# Patient Record
Sex: FEMALE | Race: WHITE | Employment: FULL TIME | URBAN - METROPOLITAN AREA
[De-identification: names, ages, dates, MRNs, and addresses within clinical notes are randomized per-mention and may not be internally consistent; named-entity substitution may affect disease eponyms.]

---

## 2019-09-10 LAB
ABO + RH BLD: NORMAL
ABO + RH BLD: NORMAL
BLD GP AB SCN SERPL QL: NORMAL
C TRACH DNA SPEC QL PROBE+SIG AMP: NORMAL
HBV SURFACE AG SERPL QL IA: NORMAL
HEMOGLOBIN: 13.1 G/DL (ref 11.7–15.7)
HIV 1+2 AB+HIV1 P24 AG SERPL QL IA: NORMAL
N GONORRHOEA DNA SPEC QL PROBE+SIG AMP: NORMAL
PLATELET # BLD AUTO: 355 10^9/L
RUBELLA ABY IGG: NORMAL
RUBELLA ANTIBODY IGG QUANTITATIVE: 3.24 IU/ML
TREPONEMA ANTIBODIES: NORMAL

## 2019-11-07 ENCOUNTER — MEDICAL CORRESPONDENCE (OUTPATIENT)
Dept: HEALTH INFORMATION MANAGEMENT | Facility: CLINIC | Age: 39
End: 2019-11-07

## 2019-11-07 ENCOUNTER — TRANSFERRED RECORDS (OUTPATIENT)
Dept: HEALTH INFORMATION MANAGEMENT | Facility: CLINIC | Age: 39
End: 2019-11-07

## 2019-12-12 ENCOUNTER — PRE VISIT (OUTPATIENT)
Dept: MATERNAL FETAL MEDICINE | Facility: CLINIC | Age: 39
End: 2019-12-12

## 2019-12-16 ENCOUNTER — OFFICE VISIT (OUTPATIENT)
Dept: MATERNAL FETAL MEDICINE | Facility: CLINIC | Age: 39
End: 2019-12-16
Attending: OBSTETRICS & GYNECOLOGY
Payer: COMMERCIAL

## 2019-12-16 ENCOUNTER — HOSPITAL ENCOUNTER (OUTPATIENT)
Dept: ULTRASOUND IMAGING | Facility: CLINIC | Age: 39
Discharge: HOME | End: 2019-12-16
Attending: OBSTETRICS & GYNECOLOGY | Admitting: OBSTETRICS & GYNECOLOGY
Payer: COMMERCIAL

## 2019-12-16 DIAGNOSIS — O09.522 MULTIGRAVIDA OF ADVANCED MATERNAL AGE IN SECOND TRIMESTER: Primary | ICD-10-CM

## 2019-12-16 DIAGNOSIS — O26.879 SHORT CERVIX AFFECTING PREGNANCY: ICD-10-CM

## 2019-12-16 DIAGNOSIS — O26.90 PREGNANCY RELATED CONDITION, ANTEPARTUM: ICD-10-CM

## 2019-12-16 DIAGNOSIS — O35.BXX0 VENTRICULAR SEPTAL DEFECT (VSD) OF FETUS IN SINGLETON PREGNANCY, ANTEPARTUM: ICD-10-CM

## 2019-12-16 DIAGNOSIS — O26.90 PREGNANCY RELATED CONDITION, ANTEPARTUM: Primary | ICD-10-CM

## 2019-12-16 PROCEDURE — 76811 OB US DETAILED SNGL FETUS: CPT

## 2019-12-16 PROCEDURE — 76817 TRANSVAGINAL US OBSTETRIC: CPT | Performed by: OBSTETRICS & GYNECOLOGY

## 2019-12-16 PROCEDURE — 96040 ZZH GENETIC COUNSELING, EACH 30 MINUTES: CPT | Mod: ZF | Performed by: GENETIC COUNSELOR, MS

## 2019-12-16 NOTE — PROGRESS NOTES
"Please see \"Imaging\" tab under \"Chart Review\" for details of today's US at the [facility].    Camron Henry MD  Maternal-Fetal Medicine      "

## 2019-12-16 NOTE — PROGRESS NOTES
[facility]  Genetic Counseling Consult    Patient:  Jessie Duarte YOB: 1980   Date of Service:  19      Jessie Duarte was seen at the [facility] for genetic consultation as part of her appointment for comprehensive ultrasound.  The indication for genetic counseling is advanced maternal age and positive NIPT for XXY. The patient was unaccompanied to today's visit.        Impression/Plan:   1. Jessie had a cell-free fetal DNA test earlier in pregnancy, which indicated an increased risk of XXY. The patient declines genetic amniocentesis today.    2. Jessie had a comprehensive (level II) ultrasound today.  Please see the ultrasound report for further details.    Pregnancy History:   /Parity:    Age at Delivery: 39 year old  CRESENCIO: 2020, by Last Menstrual Period  Gestational Age: 22w0d    No significant complications or exposures were reported in the current pregnancy.    Jessie perry pregnancy history is significant for one term  of a healthy girl with a different partner.    Medical History:   Jessie perry reported medical history is not expected to impact pregnancy management or risks to fetal development.       Family History:   A three-generation pedigree was obtained, and is scanned under the  Media  tab.   The following significant findings were reported by Jessie:    Jessie's partner, Dickson is 29 and healthy.   It was reported that Jessie's maternal half sister and half brother were identified to have Autism. They were reported to have no other health concerns and look similar to other family members. Autism is a spectrum of developmental disorders characterized by impaired social interaction, problems with verbal and nonverbal communication, and unusual, repetitive, or severely limited activities and interests.  Autism is a heterogeneous disorder, including genetic and non-genetic causes. In a  small percentage of individuals with ASD, it is a feature of a certain genetic condition such as Down syndrome, fragile X syndrome, or Rett syndrome. However, in most isolated cases the cause may be multifactorial, meaning a combination of genetic and environmental factors. Jessie is unsure if they have had a workup to determine if there may be an underlying etiology for the Autism in these relatives.  Given that these are third degree relatives to the pregnancy, the chance may be increased.    Otherwise, the reported family history is negative for multiple miscarriages, stillbirths, birth defects, intellectual disability, known genetic conditions, and consanguinity.       Carrier Screening:   The patient reports that she and the father of the pregnancy have  ancestry:     Cystic fibrosis is an autosomal recessive genetic condition that occurs with increased frequency in individuals of  ancestry and carrier screening for this condition is available.  In addition,  screening in the [facility] includes cystic fibrosis.    The patient reports that she is of  ancestry:     The hemoglobinopathies are a group of genetic blood diseases that occur with increased frequency in individuals of  ancestry and carrier screening for these conditions is available.  Carrier screening for the hemoglobinopathies includes a CBC with red blood cell indices, a ferritin level, and a quantitative hemoglobin electrophoresis or HPLC.  In addition,  screening in the [facility] includes many of the hemoglobinopathies.      Expanded carrier screening for mutations in a large panel of genes associated with autosomal recessive conditions including cystic fibrosis, spinal muscular atrophy, and others, is now available.      The patient has declined the carrier screening options reviewed today.       Risk Assessment for Chromosome Conditions:   We explained that the risk for fetal chromosome  "abnormalities increases with maternal age. We discussed specific features of common chromosome abnormalities, including Down syndrome, trisomy 13, trisomy 18, and sex chromosome trisomies.      - At age 39 at midtrimester, the risk to have a baby with Down syndrome is 1 in 129.     - At age 39 at midtrimester, the risk to have a baby with any chromosome abnormality is 1 in 65.       Jessie had maternal serum screening earlier in pregnancy.    Non-invasive Prenatal Testing (NIPT)    Maternal plasma cell-free DNA testing    Screens for fetal trisomy 21, trisomy 13, trisomy 18, and sex chromosome aneuploidy    First trimester ultrasound with nuchal translucency was reportedly within normal limits.    Jessie had a Innatal test earlier in pregnancy; we reviewed the result today, which is positive for an increased risk for XXY. No aneuploidy was detected for chromosome 13, chromosome 18 and chromosome 21.    There is currently not a lot of published data regarding the exact sensitivity and specificity of NIPT analysis with respect to screening for XXY. The performing laboratory estimated that the positive predictive value (PPV) for this result (ie. the chance that this is a \"true positive\") is about 32%.    We discussed that 47, XXY is caused by an extra sex chromosome. The chromosomal change occurs as a random event and is not inherited. Some males with 47, XXY will have a mosaic type in which some cells have 46,XY and others 47,XXY.      Possible explanations for a positive NIPT result include an affected fetus with 47,XXY or mosaic cell line, a false positive result, and confined placental mosaicism. Jessie was offered an amniocentesis to obtain a definitive diagnosis and this was declined. We also discussed that if she did not want to pursue amniocentesis, chromosome analysis could be performed on the baby's blood after delivery. This can be coordinated by the pediatrician or pediatric genetics.    We briefly discussed " the common features of 47,XXY. The specific features can be quite variable from one affected male to another, making it difficult to predict exactly what symptoms an individual may have. Additionally, it is thought that many individuals with 47,XXY are not diagnosed due to the subtlety of symptoms.     The most consistent feature of this condition is decreased testosterone. Boys with decreased testosterone can show some differences in physical development during adolescence/adulthood, including less facial/body hair, some breast development, and less muscle mass.  We reviewed that supplemental testosterone is often used to aid in the development of male secondary sexual characteristics.     Many men with 47,XXY experience infertility. Although most males with 47, XXY are infertile, many produce sperm and may be able to conceive with assisted reproduction.    We talked about that at birth, babies with 47,XXY are not generally expected to look different than other boys.  Occasionally, babies with 47,XXY have undescended testes, hypospadias, or a smaller than average penis. Later in development physical features like being taller or having flat feet may be noticed.     A portion of boys with 47,XXY have some delays in development. They can also have mild Autism, speech delay, learning difficulties including ADHD, and depression/anxiety. We reviewed that these are also common among individuals with typical sex chromosomes.     Individuals with 47, XXY are at an increased chance of developing diabetes, metabolic syndrome, osteoporosis, cardiovascular diseases, autoimmmune disorders, and certain mental health problems. Regular screening for these health problems is suggested. Jessie was encouraged to share all of this information with their pediatrician.     Jessie was provided with written information regarding 47,XXY as well as my contact information if any questions or concerns arise. I encouraged her to reach out if  there is any additional information or resources she may find helpful.        Testing Options:   We discussed the following options:   Genetic Amniocentesis    Invasive procedure typically performed in the second trimester by which amniotic fluid is obtained for the purpose of chromosome analysis and/or other prenatal genetic analysis    Diagnostic results; >99% sensitivity for fetal chromosome abnormalities    AFAFP measurement tests for open neural tube defects    We reviewed the benefits and limitations of this testing.  Screening tests provide a risk assessment specific to the pregnancy for certain fetal chromosome abnormalities, but cannot definitively diagnose or exclude a fetal chromosome abnormality.  Follow-up genetic counseling and consideration of diagnostic testing is recommended with any abnormal screening result.     Diagnostic tests carry inherent risks- including risk of miscarriage- that require careful consideration.  These tests can detect fetal chromosome abnormalities with greater than 99% certainty.  Results can be compromised by maternal cell contamination or mosaicism, and are limited by the resolution of cytogenetic G-banding technology.  There is no screening nor diagnostic test that can detect all forms of birth defects or mental disability.    It was a pleasure to be involved with Jessie perry care. Face-to-face time of the meeting was 35 minutes.    Monie Prather MS, [facility]  Maternal Fetal Medicine  [facility]  Ph: [phone]  [email or web address]

## 2019-12-19 ENCOUNTER — OFFICE VISIT (OUTPATIENT)
Dept: MATERNAL FETAL MEDICINE | Facility: CLINIC | Age: 39
End: 2019-12-19
Attending: OBSTETRICS & GYNECOLOGY
Payer: COMMERCIAL

## 2019-12-19 ENCOUNTER — HOSPITAL ENCOUNTER (OUTPATIENT)
Dept: ULTRASOUND IMAGING | Facility: CLINIC | Age: 39
Discharge: HOME | End: 2019-12-19
Attending: OBSTETRICS & GYNECOLOGY | Admitting: OBSTETRICS & GYNECOLOGY
Payer: COMMERCIAL

## 2019-12-19 DIAGNOSIS — O26.879 SHORT CERVIX AFFECTING PREGNANCY: ICD-10-CM

## 2019-12-19 DIAGNOSIS — O26.879 SHORT CERVIX AFFECTING PREGNANCY: Primary | ICD-10-CM

## 2019-12-19 PROCEDURE — 76817 TRANSVAGINAL US OBSTETRIC: CPT

## 2019-12-19 NOTE — PROGRESS NOTES
"Please see \"Imaging\" tab under \"Chart Review\" for details of today's US at the [facility].    Camron Henry MD  Maternal-Fetal Medicine      "

## 2019-12-24 ENCOUNTER — HOSPITAL ENCOUNTER (OUTPATIENT)
Dept: ULTRASOUND IMAGING | Facility: CLINIC | Age: 39
Discharge: HOME | End: 2019-12-24
Attending: OBSTETRICS & GYNECOLOGY | Admitting: OBSTETRICS & GYNECOLOGY
Payer: COMMERCIAL

## 2019-12-24 ENCOUNTER — OFFICE VISIT (OUTPATIENT)
Dept: MATERNAL FETAL MEDICINE | Facility: CLINIC | Age: 39
End: 2019-12-24
Attending: OBSTETRICS & GYNECOLOGY
Payer: COMMERCIAL

## 2019-12-24 DIAGNOSIS — O26.879 SHORT CERVIX AFFECTING PREGNANCY: ICD-10-CM

## 2019-12-24 DIAGNOSIS — O26.879 SHORT CERVIX AFFECTING PREGNANCY: Primary | ICD-10-CM

## 2019-12-24 PROCEDURE — 76817 TRANSVAGINAL US OBSTETRIC: CPT

## 2019-12-24 NOTE — PROGRESS NOTES
"Please see \"Imaging\" tab under \"Chart Review\" for details of today's US at the [facility].    Camron Henry MD  Maternal-Fetal Medicine      "

## 2020-01-24 ENCOUNTER — HOSPITAL ENCOUNTER (OUTPATIENT)
Dept: CARDIOLOGY | Facility: CLINIC | Age: 40
Discharge: HOME | End: 2020-01-24
Attending: OBSTETRICS & GYNECOLOGY | Admitting: OBSTETRICS & GYNECOLOGY
Payer: COMMERCIAL

## 2020-01-24 DIAGNOSIS — O35.BXX0 VENTRICULAR SEPTAL DEFECT (VSD) OF FETUS IN SINGLETON PREGNANCY, ANTEPARTUM: ICD-10-CM

## 2020-01-24 PROCEDURE — 76827 ECHO EXAM OF FETAL HEART: CPT

## 2020-01-24 NOTE — PROGRESS NOTES
[facility] Fetal Consult Note    Patient:  Jessie Rose MRN:  [id number]   YOB: 1980 Age:  39 year old   Date of Visit:  2020 PCP:  No Ref-Primary, Physician     Dear Dr. Henry:    I had the pleasure of seeing Jessie Rose at the [facility] on 2020 in fetal cardiology consultation. As you know, she is a 39 year old  at 27w4d who presented for fetal echocardiogram today because of suspected congenital heart disease (ventricular septal defect).     I performed and interpreted the fetal echocardiogram today, which demonstrated small mid-muscular ventricular septal defect. Normal fetal intracardiac connections. Normal right and left ventricular size and function.     With the help of diagrams, I reviewed the echo findings today with Jessie Rose. I discussed the fetal cardiac anatomy, function, physiology, and plans. I recommended delivery at [facility], with plans for  echocardiogram within the first week of life. I reviewed the importance of a post- transthoracic echocardiogram to confirm these findings and help direct management. She had appropriate questions which I addressed. I provided her with my direct contact information for additional questions after they left.    Plan:    1. No follow up Fetal Echo.   2. Transthoracic echocardiogram is recommended within the first week of life in a non-urgent fashion.     Thank you for allowing me to participate in Jessie's care. Please do not hesitate to contact me with questions or concerns.    This visit was separate from the performance and interpretation of the ultrasound. The majority of the time (>50%) was spent in counseling and coordination of care. I spent approximately 20 minutes in face-to-face time reviewing the above considerations.    Jose Houston M.D.  Pediatric Cardiology  [facility]  [phone], [facility] [address]  Fax [phone]

## 2020-03-13 ENCOUNTER — HOSPITAL ENCOUNTER (INPATIENT)
Facility: CLINIC | Age: 40
LOS: 2 days | Discharge: HOME | End: 2020-03-15
Attending: OBSTETRICS & GYNECOLOGY | Admitting: OBSTETRICS & GYNECOLOGY
Payer: COMMERCIAL

## 2020-03-13 DIAGNOSIS — O42.019 PRETERM PREMATURE RUPTURE OF MEMBRANES WITH ONSET OF LABOR WITHIN 24 HOURS OF RUPTURE: ICD-10-CM

## 2020-03-13 PROBLEM — O42.919 PRETERM PREMATURE RUPTURE OF MEMBRANES: Status: ACTIVE | Noted: 2020-03-13

## 2020-03-13 LAB
ABO + RH BLD: NORMAL
ABO + RH BLD: NORMAL
AMPHETAMINES UR QL SCN: NEGATIVE
CANNABINOIDS UR QL: NEGATIVE
COCAINE UR QL: NEGATIVE
OPIATES UR QL SCN: NEGATIVE
PCP UR QL SCN: NEGATIVE
RUPTURE OF FETAL MEMBRANES BY ROM PLUS: POSITIVE
SPECIMEN EXP DATE BLD: NORMAL

## 2020-03-13 PROCEDURE — 36415 COLL VENOUS BLD VENIPUNCTURE: CPT | Performed by: OBSTETRICS & GYNECOLOGY

## 2020-03-13 PROCEDURE — 12000000 ZZH R&B MED SURG/OB

## 2020-03-13 PROCEDURE — 99231 SBSQ HOSP IP/OBS SF/LOW 25: CPT | Performed by: NURSE PRACTITIONER

## 2020-03-13 PROCEDURE — 80307 DRUG TEST PRSMV CHEM ANLYZR: CPT | Performed by: OBSTETRICS & GYNECOLOGY

## 2020-03-13 PROCEDURE — 25800030 ZZH RX IP 258 OP 636: Performed by: OBSTETRICS & GYNECOLOGY

## 2020-03-13 PROCEDURE — 10907ZC DRAINAGE OF AMNIOTIC FLUID, THERAPEUTIC FROM PRODUCTS OF CONCEPTION, VIA NATURAL OR ARTIFICIAL OPENING: ICD-10-PCS | Performed by: OBSTETRICS & GYNECOLOGY

## 2020-03-13 PROCEDURE — 86900 BLOOD TYPING SEROLOGIC ABO: CPT | Performed by: OBSTETRICS & GYNECOLOGY

## 2020-03-13 PROCEDURE — 86901 BLOOD TYPING SEROLOGIC RH(D): CPT | Performed by: OBSTETRICS & GYNECOLOGY

## 2020-03-13 PROCEDURE — 84112 EVAL AMNIOTIC FLUID PROTEIN: CPT | Performed by: OBSTETRICS & GYNECOLOGY

## 2020-03-13 PROCEDURE — 99207 ZZC CONSULT E&M CHANGED TO SUBSEQUENT LEVEL: CPT | Performed by: NURSE PRACTITIONER

## 2020-03-13 PROCEDURE — G0463 HOSPITAL OUTPT CLINIC VISIT: HCPCS | Mod: 25

## 2020-03-13 PROCEDURE — 88307 TISSUE EXAM BY PATHOLOGIST: CPT | Mod: 26 | Performed by: OBSTETRICS & GYNECOLOGY

## 2020-03-13 PROCEDURE — 25000125 ZZHC RX 250: Performed by: OBSTETRICS & GYNECOLOGY

## 2020-03-13 PROCEDURE — 88307 TISSUE EXAM BY PATHOLOGIST: CPT | Performed by: OBSTETRICS & GYNECOLOGY

## 2020-03-13 PROCEDURE — 87653 STREP B DNA AMP PROBE: CPT | Performed by: OBSTETRICS & GYNECOLOGY

## 2020-03-13 PROCEDURE — 25000128 H RX IP 250 OP 636: Performed by: OBSTETRICS & GYNECOLOGY

## 2020-03-13 PROCEDURE — 72200001 ZZH LABOR CARE VAGINAL DELIVERY SINGLE

## 2020-03-13 PROCEDURE — 59025 FETAL NON-STRESS TEST: CPT

## 2020-03-13 PROCEDURE — 86780 TREPONEMA PALLIDUM: CPT | Performed by: OBSTETRICS & GYNECOLOGY

## 2020-03-13 RX ORDER — TERBUTALINE SULFATE 1 MG/ML
0.25 INJECTION, SOLUTION SUBCUTANEOUS
Status: DISCONTINUED | OUTPATIENT
Start: 2020-03-13 | End: 2020-03-15 | Stop reason: HOSPADM

## 2020-03-13 RX ORDER — ACETAMINOPHEN 325 MG/1
650 TABLET ORAL EVERY 4 HOURS PRN
Status: DISCONTINUED | OUTPATIENT
Start: 2020-03-13 | End: 2020-03-15 | Stop reason: HOSPADM

## 2020-03-13 RX ORDER — OXYTOCIN 10 [USP'U]/ML
10 INJECTION, SOLUTION INTRAMUSCULAR; INTRAVENOUS
Status: DISCONTINUED | OUTPATIENT
Start: 2020-03-13 | End: 2020-03-15 | Stop reason: HOSPADM

## 2020-03-13 RX ORDER — ONDANSETRON 2 MG/ML
4 INJECTION INTRAMUSCULAR; INTRAVENOUS EVERY 6 HOURS PRN
Status: DISCONTINUED | OUTPATIENT
Start: 2020-03-13 | End: 2020-03-15 | Stop reason: HOSPADM

## 2020-03-13 RX ORDER — CARBOPROST TROMETHAMINE 250 UG/ML
250 INJECTION, SOLUTION INTRAMUSCULAR
Status: DISCONTINUED | OUTPATIENT
Start: 2020-03-13 | End: 2020-03-15 | Stop reason: HOSPADM

## 2020-03-13 RX ORDER — OXYTOCIN/0.9 % SODIUM CHLORIDE 30/500 ML
100-340 PLASTIC BAG, INJECTION (ML) INTRAVENOUS CONTINUOUS PRN
Status: DISCONTINUED | OUTPATIENT
Start: 2020-03-13 | End: 2020-03-15 | Stop reason: HOSPADM

## 2020-03-13 RX ORDER — OXYTOCIN/0.9 % SODIUM CHLORIDE 30/500 ML
1-24 PLASTIC BAG, INJECTION (ML) INTRAVENOUS CONTINUOUS
Status: DISCONTINUED | OUTPATIENT
Start: 2020-03-13 | End: 2020-03-15 | Stop reason: HOSPADM

## 2020-03-13 RX ORDER — LIDOCAINE 40 MG/G
CREAM TOPICAL
Status: DISCONTINUED | OUTPATIENT
Start: 2020-03-13 | End: 2020-03-15 | Stop reason: HOSPADM

## 2020-03-13 RX ORDER — METHYLERGONOVINE MALEATE 0.2 MG/ML
200 INJECTION INTRAVENOUS
Status: DISCONTINUED | OUTPATIENT
Start: 2020-03-13 | End: 2020-03-15 | Stop reason: HOSPADM

## 2020-03-13 RX ORDER — SODIUM CHLORIDE, SODIUM LACTATE, POTASSIUM CHLORIDE, CALCIUM CHLORIDE 600; 310; 30; 20 MG/100ML; MG/100ML; MG/100ML; MG/100ML
INJECTION, SOLUTION INTRAVENOUS CONTINUOUS
Status: DISCONTINUED | OUTPATIENT
Start: 2020-03-13 | End: 2020-03-15 | Stop reason: HOSPADM

## 2020-03-13 RX ORDER — NALOXONE HYDROCHLORIDE 0.4 MG/ML
.1-.4 INJECTION, SOLUTION INTRAMUSCULAR; INTRAVENOUS; SUBCUTANEOUS
Status: DISCONTINUED | OUTPATIENT
Start: 2020-03-13 | End: 2020-03-15 | Stop reason: HOSPADM

## 2020-03-13 RX ORDER — VANCOMYCIN HYDROCHLORIDE 1 G/200ML
1000 INJECTION, SOLUTION INTRAVENOUS EVERY 12 HOURS
Status: DISCONTINUED | OUTPATIENT
Start: 2020-03-13 | End: 2020-03-14

## 2020-03-13 RX ORDER — IBUPROFEN 400 MG/1
800 TABLET, FILM COATED ORAL
Status: DISCONTINUED | OUTPATIENT
Start: 2020-03-13 | End: 2020-03-14

## 2020-03-13 RX ORDER — OXYCODONE AND ACETAMINOPHEN 5; 325 MG/1; MG/1
1 TABLET ORAL
Status: DISCONTINUED | OUTPATIENT
Start: 2020-03-13 | End: 2020-03-14

## 2020-03-13 RX ORDER — FENTANYL CITRATE 50 UG/ML
50-100 INJECTION, SOLUTION INTRAMUSCULAR; INTRAVENOUS
Status: DISCONTINUED | OUTPATIENT
Start: 2020-03-13 | End: 2020-03-15 | Stop reason: HOSPADM

## 2020-03-13 RX ADMIN — Medication 2 MILLI-UNITS/MIN: at 19:34

## 2020-03-13 RX ADMIN — Medication 340 ML/HR: at 23:40

## 2020-03-13 RX ADMIN — SODIUM CHLORIDE, POTASSIUM CHLORIDE, SODIUM LACTATE AND CALCIUM CHLORIDE: 600; 310; 30; 20 INJECTION, SOLUTION INTRAVENOUS at 17:13

## 2020-03-13 RX ADMIN — VANCOMYCIN HYDROCHLORIDE 1000 MG: 1 INJECTION, SOLUTION INTRAVENOUS at 18:02

## 2020-03-13 ASSESSMENT — MIFFLIN-ST. JEOR: SCORE: 1315.92

## 2020-03-13 NOTE — PLAN OF CARE
Multip in MAC for evaluation of PROM.  Pt stated that fluid leak started at 1400 and has continued since.  Pt denies vaginal bleeding or contractions.  Pt here alone.   has 3 yo and pt has a 4.6 yo, and this child is their first baby together.  Discussed POC.  Pt wishes to proceed.  Monitors applied and history obtained.

## 2020-03-13 NOTE — H&P
"  2020    Jessie Rose  [id number]    OB Admit History & Physical      Ms. Linette Rose  is here with leakage of fluid    She has noticed multiple gushes of clear fluid since 2:00 PM this afternoon, with ongoing leakage. She denies feeling contractions. No vaginal bleeding.    Note, her pregnancy is complicated by:    1. Short cervix - incidental finding of shortened cervix of 2.1 cm on level II anatomy scan at 22 weeks. Started on vaginal progesterone. Note no prior history of PTL    2. Abnormal Innatal - 47XXY (Klinefelter Syndrome), s/p genetics consultation, declined diagnostic testing    3. Small VSD on fetal echocardiogram. Will need  echo within 1 week of delivery; OK for delivery at [facility] per pediatric cardiology    4. Advanced maternal age    Patient's last menstrual period was 07/15/2019.   Her Estimated Date of Delivery: 2020 making her 34w4d  wks.      CRESENCIO 20 is by LMP consistent with 8w1d ultrasound    Estimated body mass index is 26.04 kg/m  as calculated from the following:    Height as of this encounter: 1.6 m (5' 3\").    Weight as of this encounter: 66.7 kg (147 lb).    See prenatal for labs.  GBS unknown (swab pending from admission), Rubella Immune, RH positive    She is a 39 year old   Her OB history:   OB History    Para Term  AB Living   2 1 1 0 0 1   SAB TAB Ectopic Multiple Live Births   0 0 0 0 1      # Outcome Date GA Lbr Alexis/2nd Weight Sex Delivery Anes PTL Lv   2 Current            1 Term 06/16/15 38w0d 01:10 :13 3.05 kg (6 lb 11.6 oz) F Vag-Spont Local N VICTOR M      Apgar1: 9  Apgar5: 9      Past Medical History:   Diagnosis Date     NO ACTIVE PROBLEMS      Past Surgical History:   Procedure Laterality Date     NO HISTORY OF SURGERY       No current outpatient medications on file.       Allergies: Penicillins      REVIEW OF SYSTEMS:  NEUROLOGIC:  Negative  EYES:  Negative  ENT:  Negative  GI:  Negative  BREAST:  " Negative  :  Negative  GYN:  Negative  CV:  Negative  PULMONARY:  Negative  MUSCULOSKELETAL:  Negative  PSYCH:  Negative        Social History     Socioeconomic History     Marital status:      Spouse name: Not on file     Number of children: Not on file     Years of education: Not on file     Highest education level: Not on file   Occupational History     Not on file   Social Needs     Financial resource strain: Not on file     Food insecurity     Worry: Not on file     Inability: Not on file     Transportation needs     Medical: Not on file     Non-medical: Not on file   Tobacco Use     Smoking status: Never Smoker     Smokeless tobacco: Never Used   Substance and Sexual Activity     Alcohol use: No     Drug use: No     Sexual activity: Yes     Partners: Male   Lifestyle     Physical activity     Days per week: Not on file     Minutes per session: Not on file     Stress: Not on file   Relationships     Social connections     Talks on phone: Not on file     Gets together: Not on file     Attends Anabaptist service: Not on file     Active member of club or organization: Not on file     Attends meetings of clubs or organizations: Not on file     Relationship status: Not on file     Intimate partner violence     Fear of current or ex partner: Not on file     Emotionally abused: Not on file     Physically abused: Not on file     Forced sexual activity: Not on file   Other Topics Concern     Not on file   Social History Narrative    ** Merged History Encounter **           History reviewed. No pertinent family history.    Vitals:     Temp: 98.4  F (36.9  C) Temp src: Temporal BP: 105/63     Resp: 14          /mod/+accels/one early decel, with contractions every  3-5 min    Gen: NAD  Abd: Soft, no fundal tenderness  SVE: 4/80/-1, vertex per triage RN, leaking clear fluid  Ext: NT/NE    A/P: 38 y/o  at 34w4d by LMP c/w 8w1d, presenting with PPROM    Fetal well being - Cat 1, reactive tracing  Will  defer late  corticosteroids due to likely imminent delivery this evening  Will consult neonatology team    PPROM >34 weeks gestation  -Will proceed toward delivery  -Start pitocin  -GBS unknown (swab pending). Will start vancomycin 2/2  status, severe reaction to PCN and unknown sensitivities    Rh+/RI    Pain - per patient preference    Admit to L&D    Anticipate     Jeanne Lynch MD MD  Dept of OB/GYN  2020

## 2020-03-13 NOTE — PROVIDER NOTIFICATION
MD at bedside and discussed POC with pt:  Admit L&D, start IV antibiotics for unknown GBS status and pitocin.

## 2020-03-13 NOTE — PLAN OF CARE
Pt notified of visitor restrictions during hospital stay. Pt admitted to 219, ambulatory.  Report to DAYLIN Proctor RN.

## 2020-03-13 NOTE — CONSULTS
I was asked to provide antepartum counseling for Jessie Rose at the request of Dr. Lynch because of prematurity gestation at 34 4/7  weeks.  The counseling included: initial delivery room stabilization, respiratory course, lung development, respiratory management including surfactant administration, apnea and bradycardia of prematurity, IVH, at risk for infection, nutrition including initial IV fluids and transition to gavage feedings then breast or bottle feeding, growth and development, and long term outcomes.     Please feel free to call with any additional questions or concerns.    Floor Time (min): 10  Face to Face Time (min): 10  Total Time (minutes): 20  More than 50% of my time was spent in direct, face to face, antepartum counseling with the above patient.    Sinai Walters, JEAN CARLOS- CNP, NNP 3/13/2020

## 2020-03-13 NOTE — PLAN OF CARE
Dr. Lynch hre to see patient and her significant other.  Discussed that patient is hungry.  Ok for patient to have a light meal and start pitocin when antibiotics infusing

## 2020-03-14 LAB
GP B STREP DNA SPEC QL NAA+PROBE: NEGATIVE
SPECIMEN SOURCE: NORMAL
T PALLIDUM AB SER QL: NONREACTIVE

## 2020-03-14 PROCEDURE — 12000035 ZZH R&B POSTPARTUM

## 2020-03-14 PROCEDURE — 0KQM0ZZ REPAIR PERINEUM MUSCLE, OPEN APPROACH: ICD-10-PCS | Performed by: OBSTETRICS & GYNECOLOGY

## 2020-03-14 PROCEDURE — 25000125 ZZHC RX 250: Performed by: OBSTETRICS & GYNECOLOGY

## 2020-03-14 PROCEDURE — 25000132 ZZH RX MED GY IP 250 OP 250 PS 637: Performed by: OBSTETRICS & GYNECOLOGY

## 2020-03-14 RX ORDER — AMOXICILLIN 250 MG
1 CAPSULE ORAL 2 TIMES DAILY
Status: DISCONTINUED | OUTPATIENT
Start: 2020-03-14 | End: 2020-03-15 | Stop reason: HOSPADM

## 2020-03-14 RX ORDER — AMOXICILLIN 250 MG
2 CAPSULE ORAL 2 TIMES DAILY
Status: DISCONTINUED | OUTPATIENT
Start: 2020-03-14 | End: 2020-03-15 | Stop reason: HOSPADM

## 2020-03-14 RX ORDER — OXYTOCIN 10 [USP'U]/ML
10 INJECTION, SOLUTION INTRAMUSCULAR; INTRAVENOUS
Status: DISCONTINUED | OUTPATIENT
Start: 2020-03-14 | End: 2020-03-15 | Stop reason: HOSPADM

## 2020-03-14 RX ORDER — BISACODYL 10 MG
10 SUPPOSITORY, RECTAL RECTAL DAILY PRN
Status: DISCONTINUED | OUTPATIENT
Start: 2020-03-16 | End: 2020-03-15 | Stop reason: HOSPADM

## 2020-03-14 RX ORDER — LANOLIN 100 %
OINTMENT (GRAM) TOPICAL
Status: DISCONTINUED | OUTPATIENT
Start: 2020-03-14 | End: 2020-03-15 | Stop reason: HOSPADM

## 2020-03-14 RX ORDER — HYDROCORTISONE 2.5 %
CREAM (GRAM) TOPICAL 3 TIMES DAILY PRN
Status: DISCONTINUED | OUTPATIENT
Start: 2020-03-14 | End: 2020-03-15 | Stop reason: HOSPADM

## 2020-03-14 RX ORDER — NALOXONE HYDROCHLORIDE 0.4 MG/ML
.1-.4 INJECTION, SOLUTION INTRAMUSCULAR; INTRAVENOUS; SUBCUTANEOUS
Status: DISCONTINUED | OUTPATIENT
Start: 2020-03-14 | End: 2020-03-15 | Stop reason: HOSPADM

## 2020-03-14 RX ORDER — OXYTOCIN/0.9 % SODIUM CHLORIDE 30/500 ML
100 PLASTIC BAG, INJECTION (ML) INTRAVENOUS CONTINUOUS
Status: DISCONTINUED | OUTPATIENT
Start: 2020-03-14 | End: 2020-03-15 | Stop reason: HOSPADM

## 2020-03-14 RX ORDER — OXYTOCIN/0.9 % SODIUM CHLORIDE 30/500 ML
340 PLASTIC BAG, INJECTION (ML) INTRAVENOUS CONTINUOUS PRN
Status: DISCONTINUED | OUTPATIENT
Start: 2020-03-14 | End: 2020-03-15 | Stop reason: HOSPADM

## 2020-03-14 RX ORDER — ACETAMINOPHEN 325 MG/1
650 TABLET ORAL EVERY 4 HOURS PRN
Status: DISCONTINUED | OUTPATIENT
Start: 2020-03-14 | End: 2020-03-15 | Stop reason: HOSPADM

## 2020-03-14 RX ORDER — IBUPROFEN 400 MG/1
800 TABLET, FILM COATED ORAL EVERY 6 HOURS PRN
Status: DISCONTINUED | OUTPATIENT
Start: 2020-03-14 | End: 2020-03-15 | Stop reason: HOSPADM

## 2020-03-14 RX ADMIN — ACETAMINOPHEN 650 MG: 325 TABLET, FILM COATED ORAL at 00:47

## 2020-03-14 RX ADMIN — ACETAMINOPHEN 650 MG: 325 TABLET, FILM COATED ORAL at 14:21

## 2020-03-14 RX ADMIN — Medication 100 ML/HR: at 00:35

## 2020-03-14 RX ADMIN — IBUPROFEN 800 MG: 400 TABLET, FILM COATED ORAL at 00:47

## 2020-03-14 RX ADMIN — SENNOSIDES AND DOCUSATE SODIUM 1 TABLET: 8.6; 5 TABLET ORAL at 08:38

## 2020-03-14 RX ADMIN — IBUPROFEN 800 MG: 400 TABLET, FILM COATED ORAL at 20:03

## 2020-03-14 RX ADMIN — ACETAMINOPHEN 650 MG: 325 TABLET, FILM COATED ORAL at 20:03

## 2020-03-14 RX ADMIN — SENNOSIDES AND DOCUSATE SODIUM 1 TABLET: 8.6; 5 TABLET ORAL at 20:03

## 2020-03-14 RX ADMIN — IBUPROFEN 800 MG: 400 TABLET, FILM COATED ORAL at 14:22

## 2020-03-14 RX ADMIN — IBUPROFEN 800 MG: 400 TABLET, FILM COATED ORAL at 08:38

## 2020-03-14 RX ADMIN — ACETAMINOPHEN 650 MG: 325 TABLET, FILM COATED ORAL at 08:38

## 2020-03-14 NOTE — PROGRESS NOTES
S: Contractions mild - she doesn't really feel them    O: Temp: (P) 99.7  F (37.6  C) Temp src: (P) Temporal BP: 105/63     Resp: 14           FHT: 130/mod/+accels/no decels  [place]: q3-5, mild  SVE 5.5 cm per RN check just now    A/P: 40 y/o  at 34w4d with PPROM    FWB - Cat 1, reactive    PPROM  Receiving IV vancomycin for GBS unknown  S/P neonatology consult  Proceeding toward delivery via IOL  Pitocin ordered on admission (yet to start)    Rh+/RI    Plans for un-medicated delivery    Anticipate     Jeanne Lynch MD 20 7:37 PM

## 2020-03-14 NOTE — PLAN OF CARE
At 0345, pt. Was admitted from L&D via w/c and transferred to bed with SBA. Arrived with baby and personal belongings. Report was taken from Jil in L&D. VSS. Fundus is firm and midline.  Vaginal bleeding is scant rubra.  SL removed.  Oriented to the room and call light system.   Voiding without difficulty.  Taking tylenol and ibuprofen every 6 hrs with good relief.  Up independently.  Using ice and tucks.  Encouraged to call with questions or concerns.  Will continue to monitor.

## 2020-03-14 NOTE — PROGRESS NOTES
"[facility]   Post-Partum Progress Note          Assessment and Plan:    Assessment:   Post-partum day #1  Normal spontaneous vaginal delivery  L&D complications: * No surgery found *  None      Doing well.  Pain well-controlled.      Plan:   Ambulation encouraged  Breast feeding strategies discussed           Interval History:   Doing well.  Pain is well-controlled.  No fevers.  No history of foul-smelling vaginal discharge.  Good appetite.  Denies chest pain, shortness of breath, nausea or vomiting.  Vaginal bleeding is similar to a heavy menstrual flow.  Ambulatory.  Baby is stable in [facility] Nurs[facility], patient is pumping.          Significant Problems:      Past Medical History:   Diagnosis Date     NO ACTIVE PROBLEMS              Review of Systems:    The patient denies any chest pain, shortness of breath, excessive pain, fever, chills, purulent drainage from the wound, nausea or vomiting.          Medications:     All medications related to the patient's surgery have been reviewed  Current Facility-Administered Medications   Medication     acetaminophen (TYLENOL) tablet 650 mg     acetaminophen (TYLENOL) tablet 650 mg     [START ON 3/16/2020] bisacodyl (DULCOLAX) Suppository 10 mg     carboprost (HEMABATE) injection 250 mcg     fentaNYL (PF) (SUBLIMAZE) injection  mcg     hydrocortisone 2.5 % cream     ibuprofen (ADVIL/MOTRIN) tablet 800 mg     ibuprofen (ADVIL/MOTRIN) tablet 800 mg     lactated ringers BOLUS 1,000 mL     lactated ringers BOLUS 1,000 mL    Or     lactated ringers BOLUS 500 mL     lactated ringers BOLUS 500 mL     lactated ringers infusion     lanolin ointment     lidocaine (LMX4) cream     lidocaine 1 % 0.1-1 mL     lidocaine 1 % 0.1-20 mL     Medication Instructions - cervical ripening and induction medications     Medication Instructions: misoprostol (CYTOTEC)- Nurse to discuss ordering with provider, if needed. Ordered via \"OB misoprostol (CYTOTEC) Postpartum " "Hemorrhage PANEL\"     methylergonovine (METHERGINE) injection 200 mcg     naloxone (NARCAN) injection 0.1-0.4 mg     naloxone (NARCAN) injection 0.1-0.4 mg     No MMR Needed - Assessment: Patient does not need MMR vaccine     NO Rho (D) immune globulin (RhoGam) needed - mother Rh POSITIVE     NO Rho (D) immune globulin (RhoGam) needed - mother Rh POSITIVE     No Tdap Needed - Assessment: Patient does not need Tdap vaccine     ondansetron (ZOFRAN) injection 4 mg     ondansetron (ZOFRAN) injection 4 mg     oxyCODONE-acetaminophen (PERCOCET) 5-325 MG per tablet 1 tablet     oxytocin (PITOCIN) 30 units in 500 mL 0.9% NaCl infusion     oxytocin (PITOCIN) 30 units in 500 mL 0.9% NaCl infusion     oxytocin (PITOCIN) 30 units in 500 mL 0.9% NaCl infusion     oxytocin (PITOCIN) 30 units in 500 mL 0.9% NaCl infusion     oxytocin (PITOCIN) injection 10 Units     oxytocin (PITOCIN) injection 10 Units     senna-docusate (SENOKOT-S/PERICOLACE) 8.6-50 MG per tablet 1 tablet    Or     senna-docusate (SENOKOT-S/PERICOLACE) 8.6-50 MG per tablet 2 tablet     sodium chloride (PF) 0.9% PF flush 3 mL     sodium chloride (PF) 0.9% PF flush 3 mL     [START ON 3/16/2020] sodium phosphate (FLEET ENEMA) 1 enema     terbutaline (BRETHINE) injection 0.25 mg     tranexamic acid (CYKLOKAPRON) bolus 1 g vial attach to NaCl 50 or 100 mL bag ADULT     tranexamic acid (CYKLOKAPRON) bolus 1 g vial attach to NaCl 50 or 100 mL bag ADULT     vancomycin (VANCOCIN) 1000 mg in dextrose 5% 200 mL PREMIX             Physical Exam:   All vitals stable  Temp: 98.2  F (36.8  C) Temp src: Oral BP: 117/64 Pulse: 64   Resp: 16        Uterine fundus is firm, non-tender and at the level of the umbilicus          Data:     All laboratory data related to this surgery reviewed  Hemoglobin   Date Value Ref Range Status   09/10/2019 13.1 11.7 - 15.7 g/dL Final   06/15/2015 12.7 11.7 - 15.7 g/dL Final     No imaging studies have been ordered    Nallely Ta MD, MD  "

## 2020-03-14 NOTE — LACTATION NOTE
"Exclusive pumping: Pt pumping exclusively for infant in NICU. Born at 34 weeks. Instructed on how to use double electric hospital grade pump. Hands free pumping bra made for patient. Pumping log with target volumes given. Encouraged to watch \"Maximizing [employer]\" video on [place]. Encouraged to pump every 2-3 hours with taking a 4 hour break once at night to sleep. Instructed on how to clean pump parts. Has a pump for home use that is from her first pregnancy 4 years ago. Pt plans to call insurance today to verify pump coverage. Would like Medela pump from hospital if she has coverage here. No further questions at this time. Will follow up as needed.  SHAGUFTA Omalley RN, BSN, PHN, IBCLC    "

## 2020-03-14 NOTE — L&D DELIVERY NOTE
OB Vaginal Delivery Note    Jessie Rose MRN# [id number]   Age: 39 year old YOB: 1980       GA: 34w5d  GP:   Labor Complications:  premature rupture of membranes  EBL: 300  mL  Delivery QBL:    Delivery Type: Vaginal, Spontaneous   ROM to Delivery Time: (Delivered) Hours: 9 Minutes: 38  [place] Weight:     1 Minute 5 Minute 10 Minute   Apgar Totals:   8   9           Delivery Details:  Jessie Rose, a 39 year old  who presented to L&D at 34w4d with  premature rupture of membranes. She was started on IV vancomycin for GBS unknown and severe penicillin allergy with unknown GBS sensitivities. She was started on pitocin. Artificial rupture of a forebag was performed. She then progressed quickly to complete dilation. Fetal heart tones at this time did show recurrent variable decelerations with contractions, but recovered to baseline of 130 with moderate variability in between. The  team was called to delivery. She pushed for approximately 10 minutes and delivered a vigorous male  with apgars of 8 and 9, weighing 5 lbs 10 oz. Of note, immediately after delivery of the , there was a large gush of blood suspicious for a placental abruption. The  was placed on the mother's abdomen. Delayed cord clamping was performed. The placenta delivered spontaneously, intact. There was a small second degree perineal laceration. After injection with 1% lidocaine, the laceration was repaired with 3-0 vicryl. She also had small bilateral periurethral lacerations which were hemostatic without repair. The fundus was firm. EBL was 300 mL (the majority of which was immediate the gush of blood following the delivery of the )    Excellent hemostasis was noted. Needle count correct. Infant and patient in delivery room in good and stable condition. The  was skin to skin with the mother for a few minutes prior to transfer to the  intensive  care unit.       Labor Event Times    Labor onset date:  3/13/20 Onset time:   2:00 PM      Rupture date/time: 3/13/20 1400   Rupture type:  Artificial Rupture of Membranes, Premature Rupture of Membranes  Fluid color:  Clear, Bloody, Pink  Fluid odor:  Normal     Delivery/Placenta Date and Time    Delivery Date:  3/13/20 Delivery Time:  11:38 PM      Labor Events and Shoulder Dystocia    Fetal Tracing Prior to Delivery:  Category 2  Shoulder dystocia present?:  Neg     Delivery (Maternal) (Provider to Complete) (158468)    Episiotomy:  None  Perineal lacerations:  2nd Repaired?:  Yes   Periurethral laceration:  bilateral Repaired?:  No   Est. blood loss (mL):  300  Number of repair packets:  1     Blood Loss  Mother: Jessie Lees #[id number]   Start of Mother's Information    IO Blood Loss  03/13/20 1400 - 03/14/20 0007    EBL (mL) Hospital Encounter 300 mL    Total  300 mL         End of Mother's Information  Mother: Jessie Lees #[id number]         Delivery - Provider to Complete ([id number])    Delivering clinician:  Jeanne Lynch MD  Attempted Delivery Types (Choose all that apply):  Spontaneous Vaginal Delivery  Delivery Type (Choose the 1 that will go to the Birth History):  Vaginal, Spontaneous          Placenta    Delayed Cord Clamping:  Done  Removal:  Spontaneous  Disposition:  Pathology     Presentation and Position    Presentation:  Vertex  Position:  Middle Occiput Anterior           Jeanne Lynch MD

## 2020-03-14 NOTE — PLAN OF CARE
VSS. Pain controlled well with Ibuprofen and Tylenol. Using ice and tucks. Pumping every 3 hours and going down to NICU. Independent with cares. Questions answered. Will continue to monitor.

## 2020-03-14 NOTE — PROGRESS NOTES
More pressure with contractions    FHT: 130/mod/+accels/intermittent variable decels  [place]: q-23  SVE: 7.5/100/0    Progressing in active phase  Anticipate second stage soon    Jeanne Lynch MD 03/13/20 11:13 PM

## 2020-03-14 NOTE — PROGRESS NOTES
S: Contractions somewhat more intense, but tolerating well    O: Temp: 99.1  F (37.3  C) Temp src: Temporal BP: 105/63     Resp: 14          FHT: 130/mod/+accels/occasional early and rare variable decels  [place]: q2-4, pitocin at 8  SVE: 90/0, AROM of tight forebag, some bloody show noted    A/P: 38 y/o  at 34w4d with PPROM    Continue to up-titrate pitocin as able  Entering active phase    Jeanne Lynch MD 20 10:47 PM

## 2020-03-14 NOTE — PLAN OF CARE
Assumed care of pt from Sandhya at 1930, assessed cervix and found to be 6 cm, started pitocin per md order since pt ruptured for 7+ hours at this point.    2235: Fetal heart tones started to have variables, RN assessed cervix and found no change, but possible fore bag present. Called MD to come assess. 2242: MD at bedside and broke fore bag. 6,90,0. Pt starting to feel pressure so md assessed cervix and found to be 7-8, 100, 0. 2325: Pt stating feeling tons of pressure and wanting to push, found to be complete plus 2. MD on unit and at bedside, pt started pushing at 2327. Viable male infant born at 2338.Delivery team in attendance. Delayed cord clamping preformed then infant taken to warmer for assessment. Pitocin started at 2340. Placenta delivered at 2344. Infant placed skin to skin with mom.     Data: Jessie Rose transferred to NICU 5 via wheelchair at 0150.   Action:  Patient and family notified of room change. Belongings kept in labor room. Accompanied by Registered Nurse. Oriented patient to surroundings. Call light within reach.  Response: Patient tolerated transfer and is stable.     Data: Jessie Rose transferred to 435 via wheelchair at 0345.   Action: Receiving unit notified of transfer: Yes. Patient and family notified of room change. Report given to denise QUINTEROS at 0400. Belongings sent to receiving unit. Accompanied by Registered Nurse. Oriented patient to surroundings. Call light within reach.   Response: Patient tolerated transfer and is stable.

## 2020-03-15 VITALS
RESPIRATION RATE: 16 BRPM | BODY MASS INDEX: 26.05 KG/M2 | TEMPERATURE: 98.1 F | DIASTOLIC BLOOD PRESSURE: 71 MMHG | WEIGHT: 147 LBS | HEART RATE: 70 BPM | SYSTOLIC BLOOD PRESSURE: 115 MMHG | HEIGHT: 63 IN

## 2020-03-15 PROCEDURE — 25000132 ZZH RX MED GY IP 250 OP 250 PS 637: Performed by: OBSTETRICS & GYNECOLOGY

## 2020-03-15 RX ORDER — ACETAMINOPHEN 325 MG/1
650 TABLET ORAL EVERY 4 HOURS PRN
Qty: 90 TABLET | Refills: 0 | Status: ON HOLD | OUTPATIENT
Start: 2020-03-15 | End: 2021-06-21

## 2020-03-15 RX ORDER — IBUPROFEN 800 MG/1
800 TABLET, FILM COATED ORAL EVERY 6 HOURS PRN
Qty: 90 TABLET | Refills: 0 | Status: ON HOLD | OUTPATIENT
Start: 2020-03-15 | End: 2021-06-21

## 2020-03-15 RX ORDER — LANOLIN 100 %
OINTMENT (GRAM) TOPICAL
Qty: 40 G | Refills: 0 | Status: SHIPPED | OUTPATIENT
Start: 2020-03-15

## 2020-03-15 RX ADMIN — IBUPROFEN 800 MG: 400 TABLET, FILM COATED ORAL at 14:53

## 2020-03-15 RX ADMIN — SENNOSIDES AND DOCUSATE SODIUM 1 TABLET: 8.6; 5 TABLET ORAL at 08:30

## 2020-03-15 RX ADMIN — ACETAMINOPHEN 650 MG: 325 TABLET, FILM COATED ORAL at 14:53

## 2020-03-15 RX ADMIN — IBUPROFEN 800 MG: 400 TABLET, FILM COATED ORAL at 08:30

## 2020-03-15 RX ADMIN — IBUPROFEN 800 MG: 400 TABLET, FILM COATED ORAL at 02:12

## 2020-03-15 RX ADMIN — ACETAMINOPHEN 650 MG: 325 TABLET, FILM COATED ORAL at 02:12

## 2020-03-15 RX ADMIN — ACETAMINOPHEN 650 MG: 325 TABLET, FILM COATED ORAL at 08:29

## 2020-03-15 NOTE — LACTATION NOTE
Routine visit: Checked in with Jessie to see how pumping is working. She is getting anywhere from drops to 1.7mls per pumping. States things are going well. She was able to place infant to breast once today and did skin to skin. Continues to pump every 3 hours. Stocked up on supplies for collecting milk to take down to NICU. Denies any questions at this time. Will continue to follow as needed. Thankful for the lactation support.  SHAGUFTA Omalley RN, BSN, PHN, IBCLC

## 2020-03-15 NOTE — PLAN OF CARE
Patient taking Tylenol and Ibuprofen for pain with adequate pain relief. Patient ambulating independently, voiding without difficulty. Patient ready for discharge per orders, all education complete. Encouraged patient to call with needs/questions. Call light within reach, will continue to monitor until discharge.

## 2020-03-15 NOTE — PLAN OF CARE
VSS, breastfeeding started in NICU intermittently, otherwise pumping at other times when not with baby.  Fundus is firm at U/1, scant flow, no clots.  Pain controlled with Tylenol and Ibuprofen.  Independent with cares and walks to NICU independently.  Will continue to monitor and support.

## 2020-03-15 NOTE — PLAN OF CARE
Pt taking tylenol and ibuprofen for pain, using ice and tucks, VSS, tolerating diet, voiding adequate amounts. Pumping for baby in NICU, ambulating to NICU at times overnight. Encouraged to call with any questions or concerns. Will continue to monitor.

## 2020-03-15 NOTE — DISCHARGE INSTRUCTIONS

## 2020-03-15 NOTE — PLAN OF CARE
Patient discharged per order. Discharge instructions reviewed with patient, patient states understanding, paperwork signed. Discharge prescription given, paper signed. Significant other not here currently to do ROP, paper given. No further questions or concerns.

## 2020-03-15 NOTE — PROGRESS NOTES
"[facility]   Post-Partum Progress Note          Assessment and Plan:    Assessment:   Post-partum day #2  Normal spontaneous vaginal delivery  PROM at 34 weeks  L&D complications: * No surgery found *  None      Doing well.      Plan:   Ambulation encouraged  Breast feeding strategies discussed  Discharge later today           Interval History:   Doing well.  Pain is well-controlled.  No fevers.  No history of foul-smelling vaginal discharge.  Good appetite.  Denies chest pain, shortness of breath, nausea or vomiting.  Vaginal bleeding is similar to a heavy menstrual flow.  Ambulatory.  Breastfeeding well. Baby is doing well in NICU.          Significant Problems:      Past Medical History:   Diagnosis Date     NO ACTIVE PROBLEMS              Review of Systems:    The patient denies any chest pain, shortness of breath, excessive pain, fever, chills, purulent drainage from the wound, nausea or vomiting.          Medications:     All medications related to the patient's surgery have been reviewed  Current Facility-Administered Medications   Medication     acetaminophen (TYLENOL) tablet 650 mg     acetaminophen (TYLENOL) tablet 650 mg     [START ON 3/16/2020] bisacodyl (DULCOLAX) Suppository 10 mg     carboprost (HEMABATE) injection 250 mcg     fentaNYL (PF) (SUBLIMAZE) injection  mcg     hydrocortisone 2.5 % cream     ibuprofen (ADVIL/MOTRIN) tablet 800 mg     lactated ringers BOLUS 1,000 mL     lactated ringers BOLUS 1,000 mL    Or     lactated ringers BOLUS 500 mL     lactated ringers BOLUS 500 mL     lactated ringers infusion     lanolin ointment     lidocaine (LMX4) cream     lidocaine 1 % 0.1-1 mL     lidocaine 1 % 0.1-20 mL     Medication Instructions - cervical ripening and induction medications     Medication Instructions: misoprostol (CYTOTEC)- Nurse to discuss ordering with provider, if needed. Ordered via \"OB misoprostol (CYTOTEC) Postpartum Hemorrhage PANEL\"     methylergonovine " (METHERGINE) injection 200 mcg     naloxone (NARCAN) injection 0.1-0.4 mg     naloxone (NARCAN) injection 0.1-0.4 mg     No MMR Needed - Assessment: Patient does not need MMR vaccine     NO Rho (D) immune globulin (RhoGam) needed - mother Rh POSITIVE     NO Rho (D) immune globulin (RhoGam) needed - mother Rh POSITIVE     No Tdap Needed - Assessment: Patient does not need Tdap vaccine     ondansetron (ZOFRAN) injection 4 mg     ondansetron (ZOFRAN) injection 4 mg     oxytocin (PITOCIN) 30 units in 500 mL 0.9% NaCl infusion     oxytocin (PITOCIN) 30 units in 500 mL 0.9% NaCl infusion     oxytocin (PITOCIN) 30 units in 500 mL 0.9% NaCl infusion     oxytocin (PITOCIN) 30 units in 500 mL 0.9% NaCl infusion     oxytocin (PITOCIN) injection 10 Units     oxytocin (PITOCIN) injection 10 Units     senna-docusate (SENOKOT-S/PERICOLACE) 8.6-50 MG per tablet 1 tablet    Or     senna-docusate (SENOKOT-S/PERICOLACE) 8.6-50 MG per tablet 2 tablet     sodium chloride (PF) 0.9% PF flush 3 mL     sodium chloride (PF) 0.9% PF flush 3 mL     [START ON 3/16/2020] sodium phosphate (FLEET ENEMA) 1 enema     terbutaline (BRETHINE) injection 0.25 mg     tranexamic acid (CYKLOKAPRON) bolus 1 g vial attach to NaCl 50 or 100 mL bag ADULT             Physical Exam:   All vitals stable  Temp: 97.9  F (36.6  C) Temp src: Oral BP: 117/73   Heart Rate: 66 Resp: 16        Uterine fundus is firm, non-tender and at the level of the umbilicus          Data:     All laboratory data related to this surgery reviewed  Hemoglobin   Date Value Ref Range Status   09/10/2019 13.1 11.7 - 15.7 g/dL Final   06/15/2015 12.7 11.7 - 15.7 g/dL Final     No imaging studies have been ordered    Nallely Ta MD, MD

## 2020-03-15 NOTE — DISCHARGE SUMMARY
[facility]    Discharge Summary  Obstetrics    Date of Admission:  3/13/2020  Date of Discharge:  3/15/2020  Discharging Provider: Nallely Ta MD, MD  Date of Service (when I saw the patient): 03/15/20    Discharge Diagnoses   * No surgery found *    History of Present Illness   Jessie Rose is a 39 year old female who presented with SROM at 34 4/7 weeks.    Hospital Course   The patient's hospital course was unremarkable.  She recovered as anticipated and experienced no post-delivery complications.  On discharge, her pain was well controlled. Vaginal bleeding is similar to peak menstrual flow.  Voiding without difficulty.  Ambulating well and tolerating a normal diet.  No fevers.  Breastfeeding well.  Infant is stable.  She was discharged on post-partum day #2. Infant stable in NICU.    Post-partum hemoglobin:   Hemoglobin   Date Value Ref Range Status   09/10/2019 13.1 11.7 - 15.7 g/dL Final         Discharge Disposition   Discharged to home   Condition at discharge: Stable    Primary Care Physician   Physician No Ref-Primary    Consultations This Hospital Stay   NEONATOLOGY IP CONSULT  ANESTHESIOLOGY IP CONSULT  HOME CARE POST PARTUM/ IP CONSULT  LACTATION IP CONSULT    Discharge Orders      Activity    Review discharge instructions     Reason for your hospital stay    Maternity care     Discharge Instructions - Postpartum visit    Schedule postpartum visit with your provider and return to clinic in 6 weeks.     Diet    Resume previous diet     Discharge Medications   Current Discharge Medication List      START taking these medications    Details   acetaminophen (TYLENOL) 325 MG tablet Take 2 tablets (650 mg) by mouth every 4 hours as needed for mild pain or fever (greater than or equal to 38  C /100.4  F (oral) or 38.5  C/ 101.4  F (core).)  Qty: 90 tablet, Refills: 0    Associated Diagnoses:  (normal spontaneous vaginal delivery);  premature rupture of membranes  with onset of labor within 24 hours of rupture; Postpartum care following vaginal delivery; Obstetrical laceration, second degree      ibuprofen (ADVIL/MOTRIN) 800 MG tablet Take 1 tablet (800 mg) by mouth every 6 hours as needed for other (cramping)  Qty: 90 tablet, Refills: 0    Associated Diagnoses:  (normal spontaneous vaginal delivery);  premature rupture of membranes with onset of labor within 24 hours of rupture; Postpartum care following vaginal delivery; Obstetrical laceration, second degree      lanolin ointment Apply topically every hour as needed for dry skin (sore nipples)  Qty: 40 g, Refills: 0    Associated Diagnoses:  (normal spontaneous vaginal delivery);  premature rupture of membranes with onset of labor within 24 hours of rupture; Postpartum care following vaginal delivery; Obstetrical laceration, second degree         CONTINUE these medications which have NOT CHANGED    Details   Prenatal Vit-Fe Fumarate-FA (PRENATAL MULTIVITAMIN  PLUS IRON) 27-0.8 MG TABS Take 1 tablet by mouth daily         STOP taking these medications       HYDROcodone-acetaminophen (NORCO) 5-325 MG per tablet Comments:   Reason for Stopping:             Allergies   Allergies   Allergen Reactions     Penicillins Rash

## 2020-03-17 LAB — COPATH REPORT: NORMAL

## 2021-02-09 ENCOUNTER — MEDICAL CORRESPONDENCE (OUTPATIENT)
Dept: HEALTH INFORMATION MANAGEMENT | Facility: CLINIC | Age: 41
End: 2021-02-09

## 2021-02-09 ENCOUNTER — TRANSFERRED RECORDS (OUTPATIENT)
Dept: HEALTH INFORMATION MANAGEMENT | Facility: CLINIC | Age: 41
End: 2021-02-09

## 2021-02-10 ENCOUNTER — TRANSCRIBE ORDERS (OUTPATIENT)
Dept: MATERNAL FETAL MEDICINE | Facility: CLINIC | Age: 41
End: 2021-02-10

## 2021-02-10 DIAGNOSIS — O26.90 PREGNANCY RELATED CONDITION, ANTEPARTUM: Primary | ICD-10-CM

## 2021-03-01 ENCOUNTER — PRE VISIT (OUTPATIENT)
Dept: MATERNAL FETAL MEDICINE | Facility: CLINIC | Age: 41
End: 2021-03-01

## 2021-03-08 ENCOUNTER — HOSPITAL ENCOUNTER (OUTPATIENT)
Dept: ULTRASOUND IMAGING | Facility: CLINIC | Age: 41
End: 2021-03-08
Attending: OBSTETRICS & GYNECOLOGY
Payer: COMMERCIAL

## 2021-03-08 ENCOUNTER — OFFICE VISIT (OUTPATIENT)
Dept: MATERNAL FETAL MEDICINE | Facility: CLINIC | Age: 41
End: 2021-03-08
Attending: OBSTETRICS & GYNECOLOGY
Payer: COMMERCIAL

## 2021-03-08 DIAGNOSIS — Z82.49: ICD-10-CM

## 2021-03-08 DIAGNOSIS — O26.90 PREGNANCY RELATED CONDITION, ANTEPARTUM: ICD-10-CM

## 2021-03-08 DIAGNOSIS — Z82.79 FAMILY HISTORY OF CONGENITAL HEART DEFECT: Primary | ICD-10-CM

## 2021-03-08 DIAGNOSIS — O09.522 MULTIGRAVIDA OF ADVANCED MATERNAL AGE IN SECOND TRIMESTER: ICD-10-CM

## 2021-03-08 PROCEDURE — 76811 OB US DETAILED SNGL FETUS: CPT | Mod: 26 | Performed by: OBSTETRICS & GYNECOLOGY

## 2021-03-08 PROCEDURE — 76817 TRANSVAGINAL US OBSTETRIC: CPT | Mod: 26 | Performed by: OBSTETRICS & GYNECOLOGY

## 2021-03-08 PROCEDURE — 76817 TRANSVAGINAL US OBSTETRIC: CPT

## 2021-03-08 PROCEDURE — 96040 HC GENETIC COUNSELING, EACH 30 MINUTES: CPT | Performed by: GENETIC COUNSELOR, MS

## 2021-03-08 NOTE — PROGRESS NOTES
[facility] [place]  Genetic Counseling Consult    Patient:  Jessie Rose YOB: 1980   Date of Service:  3/08/21      Jessie Rose was seen at the [facility] [place] for genetic consultation as part of her appointment for comprehensive ultrasound.  The indications for genetic counseling are advanced maternal age and previous child with Klinefelter syndrome and a ventricular septal defect.        Impression/Plan:   1. Jessie had a cell-free fetal DNA test earlier in pregnancy, which was normal. There was no evidence of trisomy 21, trisomy 18, trisomy 13, or the sex chromosome conditions.     2. Jessie had a comprehensive (level II) ultrasound today.  Please see the ultrasound report for further details.    3. The patient declines genetic amniocentesis and additional maternal serum screening today.    Pregnancy History:   /Parity:    Age at Delivery: 40 year old  CRESENCIO: 2021, by Last Menstrual Period  Gestational Age: 20w0d    No significant complications or exposures were reported in the current pregnancy.    Jessie s pregnancy history is significant for:  o  full-term delivery, female, alive and well today.  o  pre-term delivery, male, positive NIPT for Klinefelter syndrome (XXY). Jessie's son spent one week in the NICU following delivery, at which time confirmatory chromosome analysis was performed. She relayed that the NIPT results was a true result and that his chromosome result was 47, XXY. Klinefelter syndrome is the most common sex chromosome condition, with an incidence of ~1 in 650. It can be caused by maternal nondisjunction, though more than half of all cases are due to nondisjunction during spermatogenesis. Recurrence risk is not typically thought to be elevated. Jessie's son was also born with a small ventricular septal defect, noted prenatally. He has not needed to have surgery. We  discussed the following:  We discussed that congenital heart defects are common, occurring in 0.5 to 1.0% live births. The specific recurrence risk for first degree relatives differs according to the type of congenital heart defect and if there is an associated genetic syndrome present. In general, studies show that the overall risk contributed by a positive family history of a congenital heart defect in 1st degree relatives for the same defect is 8.15% whereas the recurrence risk for a different heart defect is 2.68%. There are also recurrence risks specific to the heart defect. Often risks for second degree relatives are not available. Furthermore, for third degree relatives the risk is likely equal to general population risks.   Ventricular septal defect (VSD), in which there is a hole in the wall that separates the right and left ventricles, is one of the most common types of congenital heart defects. This causes oxygen-rich and oxygen-poor blood to mix in the heart. Some ventricular septal defects close after birth. However, some individuals with a ventricular septal defect may require surgery. The risk for VSD in a baby is dependent on the affected relative: 2.05 (father affected), 3.0% (sibling affected), and 6.0% (mother affected).  This pregnancy is a sibling the family member with a VSD; therefore, the recurrence risk is 3%.  A fetal echocardiogram is often recommended for pregnancies of a second or first degree relation to a family member with a congenital heart defect.     Medical History:   Jessie perry reported medical history is not expected to impact pregnancy management or risks to fetal development.       Family History:   A three-generation pedigree was previously obtained by LAUREN Hernandez in 2019. This was updated today.     Jessie reports no new births, deaths, or diagnoses in the family. Please see note from 12/16/19 for additional details. We briefly discussed if any new information had been  discovered regarding Jessie's half-siblings' autism diagnoses. She reports no changes and that no underlying genetic cause has been identified.     Otherwise, the reported family history is negative for multiple miscarriages, stillbirths, birth defects, intellectual disability, known genetic conditions, and consanguinity.       Carrier Screening:   The patient reports that she and the father of the pregnancy have  ancestry:     Cystic fibrosis is an autosomal recessive genetic condition that occurs with increased frequency in individuals of  ancestry and carrier screening for this condition is available.  In addition,  screening in the [facility] includes cystic fibrosis.     The patient reports that she is of  ancestry:     The hemoglobinopathies are a group of genetic blood diseases that occur with increased frequency in individuals of  ancestry and carrier screening for these conditions is available.  Carrier screening for the hemoglobinopathies includes a CBC with red blood cell indices, a ferritin level, and a quantitative hemoglobin electrophoresis or HPLC.  In addition,  screening in the [facility] includes many of the hemoglobinopathies.       Expanded carrier screening for mutations in a large panel of genes associated with autosomal recessive conditions including cystic fibrosis, spinal muscular atrophy, and others, is now available.       The patient has declined the carrier screening options reviewed today.       Risk Assessment for Chromosome Conditions:   We explained that the risk for fetal chromosome abnormalities increases with maternal age. We discussed specific features of common chromosome abnormalities, including Down syndrome, trisomy 13, trisomy 18, and sex chromosome conditions      - At age 40 at midtrimester, the risk to have a baby with Down syndrome is 1 in 98.     - At age 40 at midtrimester, the risk to have a baby with any  chromosome abnormality is 1 in 51.       Jessie had maternal serum screening earlier in pregnancy.     Non-invasive Prenatal Testing (NIPT)    Maternal plasma cell-free DNA testing    Screens for fetal trisomy 21, trisomy 13, trisomy 18, and sex chromosome aneuploidy    First trimester ultrasound with nuchal translucency and nasal bone assessment was not performed in this pregnancy, to our knowledge.    Jessie had an Innatal test earlier in pregnancy; we reviewed the results today, which are normal for chromosome 13, chromosome 18 and chromosome 21 (no aneuploidy detected)    Given the accuracy of this test, these results greatly decrease the chance for certain fetal chromosome abnormalities    We discussed the limitations of normal NIPT results    MSAFP (after 15 weeks for open neural tube defect screening) results were not available for our review today.       Testing Options:   We discussed the following options:   Genetic Amniocentesis    Invasive procedure typically performed in the second trimester by which amniotic fluid is obtained for the purpose of chromosome analysis and/or other prenatal genetic analysis    Diagnostic results; >99% sensitivity for fetal chromosome abnormalities    AFAFP measurement tests for open neural tube defects       Comprehensive (Level II) ultrasound: Detailed ultrasound performed between 18-22 weeks gestation to screen for major birth defects and markers for aneuploidy.    Fetal echocardiogram: A detailed cardiac ultrasound performed between 22 and 26 weeks to screen for congenital heart defects. Fetal echocardiogram cannot definitively diagnose or exclude the presence of all congenital heart defects      We reviewed the benefits and limitations of this testing.  Screening tests provide a risk assessment specific to the pregnancy for certain fetal chromosome abnormalities, but cannot definitively diagnose or exclude a fetal chromosome abnormality.  Follow-up genetic counseling and  consideration of diagnostic testing is recommended with any abnormal screening result.     Diagnostic tests carry inherent risks- including risk of miscarriage- that require careful consideration.  These tests can detect fetal chromosome abnormalities with greater than 99% certainty.  Results can be compromised by maternal cell contamination or mosaicism, and are limited by the resolution of cytogenetic G-banding technology.  There is no screening nor diagnostic test that can detect all forms of birth defects or mental disability.    It was a pleasure to be involved with Jessie perry care. Face-to-face time of the meeting was 20 minutes.      Izzy Strong MS, [facility]  Licensed Genetic Counselor  [facility]  Maternal Fetal Medicine  [email or web address]  [phone]

## 2021-03-25 ENCOUNTER — HOSPITAL ENCOUNTER (OUTPATIENT)
Dept: ULTRASOUND IMAGING | Facility: CLINIC | Age: 41
End: 2021-03-25
Attending: OBSTETRICS & GYNECOLOGY
Payer: COMMERCIAL

## 2021-03-25 ENCOUNTER — OFFICE VISIT (OUTPATIENT)
Dept: MATERNAL FETAL MEDICINE | Facility: CLINIC | Age: 41
End: 2021-03-25
Attending: OBSTETRICS & GYNECOLOGY
Payer: COMMERCIAL

## 2021-03-25 ENCOUNTER — TRANSFERRED RECORDS (OUTPATIENT)
Dept: HEALTH INFORMATION MANAGEMENT | Facility: CLINIC | Age: 41
End: 2021-03-25

## 2021-03-25 DIAGNOSIS — O26.879 SHORT CERVIX AFFECTING PREGNANCY: Primary | ICD-10-CM

## 2021-03-25 DIAGNOSIS — Z82.79 FAMILY HISTORY OF CONGENITAL HEART DEFECT: ICD-10-CM

## 2021-03-25 DIAGNOSIS — O26.90 PREGNANCY RELATED CONDITION, ANTEPARTUM: ICD-10-CM

## 2021-03-25 PROCEDURE — 76825 ECHO EXAM OF FETAL HEART: CPT | Mod: 26 | Performed by: OBSTETRICS & GYNECOLOGY

## 2021-03-25 PROCEDURE — 76827 ECHO EXAM OF FETAL HEART: CPT | Mod: 26 | Performed by: OBSTETRICS & GYNECOLOGY

## 2021-03-25 PROCEDURE — 93325 DOPPLER ECHO COLOR FLOW MAPG: CPT | Mod: 26 | Performed by: OBSTETRICS & GYNECOLOGY

## 2021-03-25 PROCEDURE — 76817 TRANSVAGINAL US OBSTETRIC: CPT | Mod: 26 | Performed by: OBSTETRICS & GYNECOLOGY

## 2021-03-25 PROCEDURE — 76817 TRANSVAGINAL US OBSTETRIC: CPT

## 2021-03-25 PROCEDURE — 93325 DOPPLER ECHO COLOR FLOW MAPG: CPT

## 2021-03-25 PROCEDURE — 76827 ECHO EXAM OF FETAL HEART: CPT

## 2021-03-25 NOTE — PROGRESS NOTES
"Please see \"Imaging\" tab under \"Chart Review\" for details of today's US.    Radha Mckinnon, DO    "

## 2021-04-02 ENCOUNTER — OFFICE VISIT (OUTPATIENT)
Dept: MATERNAL FETAL MEDICINE | Facility: CLINIC | Age: 41
End: 2021-04-02
Attending: OBSTETRICS & GYNECOLOGY
Payer: COMMERCIAL

## 2021-04-02 ENCOUNTER — HOSPITAL ENCOUNTER (OUTPATIENT)
Dept: ULTRASOUND IMAGING | Facility: CLINIC | Age: 41
End: 2021-04-02
Attending: OBSTETRICS & GYNECOLOGY
Payer: COMMERCIAL

## 2021-04-02 DIAGNOSIS — O44.40 LOW LYING PLACENTA, ANTEPARTUM: ICD-10-CM

## 2021-04-02 DIAGNOSIS — O09.522 MULTIGRAVIDA OF ADVANCED MATERNAL AGE IN SECOND TRIMESTER: ICD-10-CM

## 2021-04-02 DIAGNOSIS — O09.899 HISTORY OF PRETERM DELIVERY, CURRENTLY PREGNANT: Primary | ICD-10-CM

## 2021-04-02 DIAGNOSIS — O26.879 SHORT CERVIX AFFECTING PREGNANCY: ICD-10-CM

## 2021-04-02 PROCEDURE — 76817 TRANSVAGINAL US OBSTETRIC: CPT

## 2021-04-02 PROCEDURE — 76817 TRANSVAGINAL US OBSTETRIC: CPT | Mod: 26 | Performed by: OBSTETRICS & GYNECOLOGY

## 2021-04-02 NOTE — PROGRESS NOTES
The patient was seen for an ultrasound in the Maternal[facility]Fetal Medicine Center at the [facility] today.  For a detailed report of the ultrasound examination, please see the ultrasound report which can be found under the imaging tab.    Jocelyn Rashid MD  , OB/GYN  Maternal-Fetal Medicine  [phone] (Pager)

## 2021-05-07 ENCOUNTER — TRANSFERRED RECORDS (OUTPATIENT)
Dept: HEALTH INFORMATION MANAGEMENT | Facility: CLINIC | Age: 41
End: 2021-05-07

## 2021-06-04 ENCOUNTER — HOSPITAL ENCOUNTER (OUTPATIENT)
Dept: ULTRASOUND IMAGING | Facility: CLINIC | Age: 41
End: 2021-06-04
Attending: OBSTETRICS & GYNECOLOGY
Payer: COMMERCIAL

## 2021-06-04 ENCOUNTER — OFFICE VISIT (OUTPATIENT)
Dept: MATERNAL FETAL MEDICINE | Facility: CLINIC | Age: 41
End: 2021-06-04
Attending: OBSTETRICS & GYNECOLOGY
Payer: COMMERCIAL

## 2021-06-04 DIAGNOSIS — O44.40 LOW LYING PLACENTA, ANTEPARTUM: ICD-10-CM

## 2021-06-04 DIAGNOSIS — O44.40 LOW LYING PLACENTA, ANTEPARTUM: Primary | ICD-10-CM

## 2021-06-04 PROCEDURE — 76816 OB US FOLLOW-UP PER FETUS: CPT | Mod: 26 | Performed by: OBSTETRICS & GYNECOLOGY

## 2021-06-04 PROCEDURE — 76816 OB US FOLLOW-UP PER FETUS: CPT

## 2021-06-04 NOTE — PROGRESS NOTES
"Please see \"Imaging\" tab under \"Chart Review\" for details of today's US at the [facility].    Camron Henry MD  Maternal-Fetal Medicine      "

## 2021-06-21 ENCOUNTER — HOSPITAL ENCOUNTER (INPATIENT)
Facility: CLINIC | Age: 41
LOS: 2 days | Discharge: HOME | End: 2021-06-23
Attending: OBSTETRICS & GYNECOLOGY | Admitting: OBSTETRICS & GYNECOLOGY
Payer: COMMERCIAL

## 2021-06-21 LAB
ABO + RH BLD: NORMAL
ABO + RH BLD: NORMAL
AMPHETAMINES UR QL SCN: NEGATIVE
CANNABINOIDS UR QL: NEGATIVE
COCAINE UR QL: NEGATIVE
LABORATORY COMMENT REPORT: NORMAL
OPIATES UR QL SCN: NEGATIVE
PCP UR QL SCN: NEGATIVE
SARS-COV-2 RNA RESP QL NAA+PROBE: NEGATIVE
SPECIMEN EXP DATE BLD: NORMAL
SPECIMEN SOURCE: NORMAL
T PALLIDUM AB SER QL: NONREACTIVE

## 2021-06-21 PROCEDURE — 86901 BLOOD TYPING SEROLOGIC RH(D): CPT | Performed by: OBSTETRICS & GYNECOLOGY

## 2021-06-21 PROCEDURE — 120N000012 HC R&B POSTPARTUM

## 2021-06-21 PROCEDURE — 250N000009 HC RX 250: Performed by: OBSTETRICS & GYNECOLOGY

## 2021-06-21 PROCEDURE — 250N000013 HC RX MED GY IP 250 OP 250 PS 637: Performed by: OBSTETRICS & GYNECOLOGY

## 2021-06-21 PROCEDURE — 722N000001 HC LABOR CARE VAGINAL DELIVERY SINGLE

## 2021-06-21 PROCEDURE — 80307 DRUG TEST PRSMV CHEM ANLYZR: CPT | Performed by: OBSTETRICS & GYNECOLOGY

## 2021-06-21 PROCEDURE — 258N000003 HC RX IP 258 OP 636: Performed by: OBSTETRICS & GYNECOLOGY

## 2021-06-21 PROCEDURE — 86900 BLOOD TYPING SEROLOGIC ABO: CPT | Performed by: OBSTETRICS & GYNECOLOGY

## 2021-06-21 PROCEDURE — 87635 SARS-COV-2 COVID-19 AMP PRB: CPT | Performed by: OBSTETRICS & GYNECOLOGY

## 2021-06-21 PROCEDURE — 0HQ9XZZ REPAIR PERINEUM SKIN, EXTERNAL APPROACH: ICD-10-PCS | Performed by: OBSTETRICS & GYNECOLOGY

## 2021-06-21 PROCEDURE — G0463 HOSPITAL OUTPT CLINIC VISIT: HCPCS

## 2021-06-21 PROCEDURE — 86780 TREPONEMA PALLIDUM: CPT | Performed by: OBSTETRICS & GYNECOLOGY

## 2021-06-21 PROCEDURE — 36415 COLL VENOUS BLD VENIPUNCTURE: CPT | Performed by: OBSTETRICS & GYNECOLOGY

## 2021-06-21 PROCEDURE — 10907ZC DRAINAGE OF AMNIOTIC FLUID, THERAPEUTIC FROM PRODUCTS OF CONCEPTION, VIA NATURAL OR ARTIFICIAL OPENING: ICD-10-PCS | Performed by: OBSTETRICS & GYNECOLOGY

## 2021-06-21 RX ORDER — IBUPROFEN 400 MG/1
800 TABLET, FILM COATED ORAL
Status: COMPLETED | OUTPATIENT
Start: 2021-06-21 | End: 2021-06-21

## 2021-06-21 RX ORDER — OXYTOCIN/0.9 % SODIUM CHLORIDE 30/500 ML
100 PLASTIC BAG, INJECTION (ML) INTRAVENOUS CONTINUOUS
Status: DISCONTINUED | OUTPATIENT
Start: 2021-06-21 | End: 2021-06-23 | Stop reason: HOSPADM

## 2021-06-21 RX ORDER — METHYLERGONOVINE MALEATE 0.2 MG/ML
200 INJECTION INTRAVENOUS
Status: DISCONTINUED | OUTPATIENT
Start: 2021-06-21 | End: 2021-06-22

## 2021-06-21 RX ORDER — NALOXONE HYDROCHLORIDE 0.4 MG/ML
0.2 INJECTION, SOLUTION INTRAMUSCULAR; INTRAVENOUS; SUBCUTANEOUS
Status: DISCONTINUED | OUTPATIENT
Start: 2021-06-21 | End: 2021-06-22

## 2021-06-21 RX ORDER — TRANEXAMIC ACID 10 MG/ML
1 INJECTION, SOLUTION INTRAVENOUS EVERY 30 MIN PRN
Status: DISCONTINUED | OUTPATIENT
Start: 2021-06-21 | End: 2021-06-22

## 2021-06-21 RX ORDER — OXYCODONE AND ACETAMINOPHEN 5; 325 MG/1; MG/1
1 TABLET ORAL
Status: DISCONTINUED | OUTPATIENT
Start: 2021-06-21 | End: 2021-06-22

## 2021-06-21 RX ORDER — NALOXONE HYDROCHLORIDE 0.4 MG/ML
0.4 INJECTION, SOLUTION INTRAMUSCULAR; INTRAVENOUS; SUBCUTANEOUS
Status: DISCONTINUED | OUTPATIENT
Start: 2021-06-21 | End: 2021-06-22

## 2021-06-21 RX ORDER — OXYTOCIN/0.9 % SODIUM CHLORIDE 30/500 ML
340 PLASTIC BAG, INJECTION (ML) INTRAVENOUS CONTINUOUS PRN
Status: DISCONTINUED | OUTPATIENT
Start: 2021-06-21 | End: 2021-06-23 | Stop reason: HOSPADM

## 2021-06-21 RX ORDER — SODIUM CHLORIDE, SODIUM LACTATE, POTASSIUM CHLORIDE, CALCIUM CHLORIDE 600; 310; 30; 20 MG/100ML; MG/100ML; MG/100ML; MG/100ML
INJECTION, SOLUTION INTRAVENOUS CONTINUOUS
Status: DISCONTINUED | OUTPATIENT
Start: 2021-06-21 | End: 2021-06-22

## 2021-06-21 RX ORDER — CLINDAMYCIN PHOSPHATE 900 MG/50ML
900 INJECTION, SOLUTION INTRAVENOUS EVERY 8 HOURS
Status: DISCONTINUED | OUTPATIENT
Start: 2021-06-21 | End: 2021-06-22

## 2021-06-21 RX ORDER — OXYTOCIN 10 [USP'U]/ML
10 INJECTION, SOLUTION INTRAMUSCULAR; INTRAVENOUS
Status: DISCONTINUED | OUTPATIENT
Start: 2021-06-21 | End: 2021-06-23 | Stop reason: HOSPADM

## 2021-06-21 RX ORDER — CARBOPROST TROMETHAMINE 250 UG/ML
250 INJECTION, SOLUTION INTRAMUSCULAR
Status: DISCONTINUED | OUTPATIENT
Start: 2021-06-21 | End: 2021-06-22

## 2021-06-21 RX ORDER — AMOXICILLIN 250 MG
1 CAPSULE ORAL 2 TIMES DAILY
Status: DISCONTINUED | OUTPATIENT
Start: 2021-06-21 | End: 2021-06-23 | Stop reason: HOSPADM

## 2021-06-21 RX ORDER — BISACODYL 10 MG
10 SUPPOSITORY, RECTAL RECTAL DAILY PRN
Status: DISCONTINUED | OUTPATIENT
Start: 2021-06-23 | End: 2021-06-23 | Stop reason: HOSPADM

## 2021-06-21 RX ORDER — OXYTOCIN/0.9 % SODIUM CHLORIDE 30/500 ML
1-24 PLASTIC BAG, INJECTION (ML) INTRAVENOUS CONTINUOUS
Status: DISCONTINUED | OUTPATIENT
Start: 2021-06-21 | End: 2021-06-22

## 2021-06-21 RX ORDER — AMOXICILLIN 250 MG
2 CAPSULE ORAL 2 TIMES DAILY
Status: DISCONTINUED | OUTPATIENT
Start: 2021-06-21 | End: 2021-06-23 | Stop reason: HOSPADM

## 2021-06-21 RX ORDER — ACETAMINOPHEN 325 MG/1
650 TABLET ORAL EVERY 4 HOURS PRN
Status: DISCONTINUED | OUTPATIENT
Start: 2021-06-21 | End: 2021-06-22

## 2021-06-21 RX ORDER — LIDOCAINE 40 MG/G
CREAM TOPICAL
Status: DISCONTINUED | OUTPATIENT
Start: 2021-06-21 | End: 2021-06-22

## 2021-06-21 RX ORDER — HYDROCORTISONE 2.5 %
CREAM (GRAM) TOPICAL 3 TIMES DAILY PRN
Status: DISCONTINUED | OUTPATIENT
Start: 2021-06-21 | End: 2021-06-23 | Stop reason: HOSPADM

## 2021-06-21 RX ORDER — OXYTOCIN 10 [USP'U]/ML
10 INJECTION, SOLUTION INTRAMUSCULAR; INTRAVENOUS
Status: DISCONTINUED | OUTPATIENT
Start: 2021-06-21 | End: 2021-06-22

## 2021-06-21 RX ORDER — ACETAMINOPHEN 325 MG/1
650 TABLET ORAL EVERY 4 HOURS PRN
Status: DISCONTINUED | OUTPATIENT
Start: 2021-06-21 | End: 2021-06-23 | Stop reason: HOSPADM

## 2021-06-21 RX ORDER — ONDANSETRON 2 MG/ML
4 INJECTION INTRAMUSCULAR; INTRAVENOUS EVERY 6 HOURS PRN
Status: DISCONTINUED | OUTPATIENT
Start: 2021-06-21 | End: 2021-06-22

## 2021-06-21 RX ORDER — TRANEXAMIC ACID 10 MG/ML
1 INJECTION, SOLUTION INTRAVENOUS EVERY 30 MIN PRN
Status: DISCONTINUED | OUTPATIENT
Start: 2021-06-21 | End: 2021-06-23 | Stop reason: HOSPADM

## 2021-06-21 RX ORDER — MODIFIED LANOLIN
OINTMENT (GRAM) TOPICAL
Status: DISCONTINUED | OUTPATIENT
Start: 2021-06-21 | End: 2021-06-23 | Stop reason: HOSPADM

## 2021-06-21 RX ORDER — IBUPROFEN 600 MG/1
600 TABLET, FILM COATED ORAL EVERY 6 HOURS PRN
Status: DISCONTINUED | OUTPATIENT
Start: 2021-06-21 | End: 2021-06-23 | Stop reason: HOSPADM

## 2021-06-21 RX ORDER — OXYTOCIN/0.9 % SODIUM CHLORIDE 30/500 ML
100-340 PLASTIC BAG, INJECTION (ML) INTRAVENOUS CONTINUOUS PRN
Status: COMPLETED | OUTPATIENT
Start: 2021-06-21 | End: 2021-06-21

## 2021-06-21 RX ADMIN — ACETAMINOPHEN 650 MG: 325 TABLET, FILM COATED ORAL at 18:49

## 2021-06-21 RX ADMIN — Medication 2 MILLI-UNITS/MIN: at 16:00

## 2021-06-21 RX ADMIN — SENNOSIDES AND DOCUSATE SODIUM 1 TABLET: 8.6; 5 TABLET ORAL at 21:42

## 2021-06-21 RX ADMIN — IBUPROFEN 800 MG: 400 TABLET ORAL at 18:48

## 2021-06-21 RX ADMIN — CLINDAMYCIN PHOSPHATE 900 MG: 900 INJECTION, SOLUTION INTRAVENOUS at 12:51

## 2021-06-21 RX ADMIN — Medication 340 ML/HR: at 17:43

## 2021-06-21 RX ADMIN — LIDOCAINE HYDROCHLORIDE 20 ML: 10 INJECTION, SOLUTION INFILTRATION; PERINEURAL at 17:45

## 2021-06-21 RX ADMIN — SODIUM CHLORIDE, POTASSIUM CHLORIDE, SODIUM LACTATE AND CALCIUM CHLORIDE: 600; 310; 30; 20 INJECTION, SOLUTION INTRAVENOUS at 12:51

## 2021-06-21 NOTE — L&D DELIVERY NOTE
@ 1737  35 weeks gestation    2nd stage 1-2 minutes    Female  Weight pending  apgars 9-9    Placenta spont / intact / 3vc    First degree perineal laceration; repaired with two interrupted 3.0 chromic    Placenta spont / intact / 3vc    NNP @ delivery    QBL 150cc    Rh positive  Rubella immune    Dictated      Shantanu DIEGO

## 2021-06-21 NOTE — PROGRESS NOTES
Oxytocin augmentation underway (6 mU/min)    FHT Category 1    6-7 / C / 0-1  AROM forebag, clear fluid    >>>entering active phase    S/p one dose of IV clindamycin >4 hours ago (GBS unknown)    Awaiting descent of fetal vertex    AROM forebag performed to augment progress    FHT reassuring    CPM with oxytocin    Shantanu DIEGO

## 2021-06-21 NOTE — PLAN OF CARE
Care assumed. PIV started. Clindamycin started to GBS unknown status.   Dr. Fournier in house, plan to let patient labor on her own. Patient plans to deliver without pain medication.

## 2021-06-21 NOTE — H&P
"40 year old para 1102  35 weeks gestation  Presents to FSD today following ROM ~0730  Contractions mildly uncomfortable    Prenatal course   EDC    Gestational age as above   A positive   Ab screen negative   Rubella immune   RPR / HIV / HepB / HepC all negative   2021 hgb 12.4 / PLT 308K   Normal NIPT / XX   Normal msAFP / 1.58 MOM   Normal fetal anatomy US / posterior placenta   Normal GTT   US EFW / 30 wk / ~50%   May 7 hgb 10.4 / PLT 293K   GBS unknown    Past history    / 38 week  / 6-12 baby   2020 / 34wk 5d  / 5-10 baby (XXY) /  hospitalized ~one week     meds / vitamins    Allergies / PCN    Afebrile  Normotensive    FHT Category 1  Contractions mildly uncomfortable, every 3-5 minutes  5 cm per RN; forebag felt    A/p    35 week para 2  Uncomplicated prenatal course  GBS unknown    History of two vaginal deliveries (38 wk, 34+ wk)  (labored twice without epidural)    ROM today ~0730  Now in latent phase of labor / advanced dilatation    Will give IV clindamycin (GBS unknown)    Will expectantly manage labor for now (to hopefully allow for \"antibiotic time\")    May consider augmentation (eg AROM forebag and/or IV oxytocin) ~3-4 hours from now    Shantanu DIEGO      "

## 2021-06-21 NOTE — PROVIDER NOTIFICATION
06/21/21 1517   Provider Notification   Provider Name/Title Shantanu   Method of Notification Phone   Request Evaluate - Remote   Notification Reason Uterine Activity;Labor Status;Status Update   Telephone call from Dr. Fournier. Updated on irregular contractions/uterine activity and patient not feeling much for pain. Orders to start pitocin at 1600, and Dr. Fournier will plan to rupture pt's forebag around 1630.

## 2021-06-21 NOTE — PLAN OF CARE
"Jessie presents stating \"my water broke this am\". Admitted to room 215 and monitors applied with consent and NST obtained. Leaking copious clear fluid. Jessie is currently 35 weeks.  1140- Dr Fournier paged.  "

## 2021-06-22 PROCEDURE — 250N000013 HC RX MED GY IP 250 OP 250 PS 637: Performed by: OBSTETRICS & GYNECOLOGY

## 2021-06-22 PROCEDURE — 120N000012 HC R&B POSTPARTUM

## 2021-06-22 RX ORDER — ACETAMINOPHEN 325 MG/1
650 TABLET ORAL EVERY 4 HOURS PRN
Start: 2021-06-22 | End: 2021-06-23

## 2021-06-22 RX ORDER — IBUPROFEN 600 MG/1
600 TABLET, FILM COATED ORAL EVERY 6 HOURS PRN
Start: 2021-06-22 | End: 2021-06-23

## 2021-06-22 RX ADMIN — SENNOSIDES AND DOCUSATE SODIUM 1 TABLET: 8.6; 5 TABLET ORAL at 19:36

## 2021-06-22 RX ADMIN — ACETAMINOPHEN 650 MG: 325 TABLET, FILM COATED ORAL at 09:22

## 2021-06-22 RX ADMIN — ACETAMINOPHEN 650 MG: 325 TABLET, FILM COATED ORAL at 02:52

## 2021-06-22 RX ADMIN — ACETAMINOPHEN 650 MG: 325 TABLET, FILM COATED ORAL at 21:38

## 2021-06-22 RX ADMIN — IBUPROFEN 600 MG: 600 TABLET ORAL at 21:38

## 2021-06-22 RX ADMIN — SENNOSIDES AND DOCUSATE SODIUM 1 TABLET: 8.6; 5 TABLET ORAL at 09:22

## 2021-06-22 RX ADMIN — IBUPROFEN 600 MG: 600 TABLET ORAL at 09:22

## 2021-06-22 RX ADMIN — IBUPROFEN 600 MG: 600 TABLET ORAL at 02:52

## 2021-06-22 RX ADMIN — IBUPROFEN 600 MG: 600 TABLET ORAL at 15:15

## 2021-06-22 RX ADMIN — ACETAMINOPHEN 650 MG: 325 TABLET, FILM COATED ORAL at 15:15

## 2021-06-22 NOTE — PLAN OF CARE
Vital signs stable. Postpartum assessment WDL. Uterine fundus is firm and midline. Scant flow. Pain well controlled with PRN Tylenol and Ibuprofen. Up and ambulating; free of dizziness. Voiding. Urine culture sent, drugs of abuse all came back negative. Breastfeeding well. Questions/concerns addressed.

## 2021-06-22 NOTE — PROGRESS NOTES
S: Feeling well. Pain controlled. Lochia normal. Ambulating. Voiding. Her  is doing well and in the room with her. Working on breast feeding    O: Temp: 98.6  F (37  C) Temp src: Oral BP: 112/66 Pulse: 67   Resp: 16 SpO2: 95 %        Gen: NAD  Abd: Soft, NT, fundus firm  Ext: NT/NE    A/P: 39 y/o  PPD#1 s/p  after PPROM at 35w0d    Recovering appropriately postpartum  Rh positive, rubella immune (no MMR needed)  Needs Tdap today  She may want to discharge home this evening after 24 hours of care. Will place discharge orders--but OK to cancel if she prefers discharge tomorrow AM (no need to call me back)    Jeanne Lynch MD 21 8:28 AM

## 2021-06-22 NOTE — PLAN OF CARE
Pt transferred from labor and delivery at 2015 by wheelchair with baby in arms.  ID bands double-checked with transferring RN and parents. Call light within reach. Oriented to room and unit. Pt tolerated transfer and is stable. Will continue to monitor.

## 2021-06-22 NOTE — PLAN OF CARE
VSS Pt using tylenol and Ibuprofen for pain control. Up independently in the room. Voiding in good amounts.Breastfeeding on demand, infant latching well.

## 2021-06-22 NOTE — PLAN OF CARE
Data: Jessie Rose transferred to [facility] via wheelchair at 2015. Baby transferred via parent's arms.  Action: Receiving unit notified of transfer: Yes. Patient and family notified of room change. Report given to Lita Desouza RN at 2015. Belongings sent to receiving unit. Accompanied by Registered Nurse. Oriented patient to surroundings. Call light within reach. ID bands double-checked with receiving RN.  Response: Patient tolerated transfer and is stable.

## 2021-06-23 VITALS
SYSTOLIC BLOOD PRESSURE: 101 MMHG | OXYGEN SATURATION: 95 % | RESPIRATION RATE: 16 BRPM | HEART RATE: 55 BPM | DIASTOLIC BLOOD PRESSURE: 66 MMHG | TEMPERATURE: 97.9 F

## 2021-06-23 PROCEDURE — 250N000013 HC RX MED GY IP 250 OP 250 PS 637: Performed by: OBSTETRICS & GYNECOLOGY

## 2021-06-23 RX ORDER — IBUPROFEN 600 MG/1
600 TABLET, FILM COATED ORAL EVERY 6 HOURS PRN
Qty: 90 TABLET | Refills: 0 | Status: SHIPPED | OUTPATIENT
Start: 2021-06-23

## 2021-06-23 RX ORDER — ACETAMINOPHEN 325 MG/1
650 TABLET ORAL EVERY 4 HOURS PRN
Qty: 90 TABLET | Refills: 0 | Status: SHIPPED | OUTPATIENT
Start: 2021-06-23

## 2021-06-23 RX ADMIN — SENNOSIDES AND DOCUSATE SODIUM 2 TABLET: 8.6; 5 TABLET ORAL at 08:13

## 2021-06-23 RX ADMIN — ACETAMINOPHEN 650 MG: 325 TABLET, FILM COATED ORAL at 09:36

## 2021-06-23 RX ADMIN — ACETAMINOPHEN 650 MG: 325 TABLET, FILM COATED ORAL at 04:01

## 2021-06-23 RX ADMIN — IBUPROFEN 600 MG: 600 TABLET ORAL at 09:36

## 2021-06-23 RX ADMIN — IBUPROFEN 600 MG: 600 TABLET ORAL at 04:01

## 2021-06-23 NOTE — PROGRESS NOTES
[facility]   Post-Partum Progress Note          Assessment and Plan:    Assessment:   Post-partum day #2  Normal spontaneous vaginal delivery  L&D complications: * No surgery found *  None      Doing well.  No excessive bleeding  Pain well-controlled.      Plan:   Ambulation encouraged  Breast feeding strategies discussed  Baby needs to stay due to not passing carseat test  Discharge later today           Interval History:   Doing well.  Pain is well-controlled.  No fevers.  No history of foul-smelling vaginal discharge.  Good appetite.  Denies chest pain, shortness of breath, nausea or vomiting.  Vaginal bleeding is similar to a heavy menstrual flow.  Ambulatory.  Breastfeeding well.          Significant Problems:      Past Medical History:   Diagnosis Date     NO ACTIVE PROBLEMS              Review of Systems:    The patient denies any chest pain, shortness of breath, excessive pain, fever, chills, purulent drainage from the wound, nausea or vomiting.          Medications:     All medications related to the patient's surgery have been reviewed  Current Facility-Administered Medications   Medication     acetaminophen (TYLENOL) tablet 650 mg     bisacodyl (DULCOLAX) Suppository 10 mg     hydrocortisone 2.5 % cream     ibuprofen (ADVIL/MOTRIN) tablet 600 mg     lactated ringers BOLUS 1,000 mL     lanolin cream     No MMR Needed - Assessment: Patient does not need MMR vaccine     NO Rho (D) immune globulin (RhoGam) needed - mother Rh POSITIVE     oxytocin (PITOCIN) 30 units in 500 mL 0.9% NaCl infusion     oxytocin (PITOCIN) 30 units in 500 mL 0.9% NaCl infusion     oxytocin (PITOCIN) injection 10 Units     senna-docusate (SENOKOT-S/PERICOLACE) 8.6-50 MG per tablet 1 tablet    Or     senna-docusate (SENOKOT-S/PERICOLACE) 8.6-50 MG per tablet 2 tablet     sodium phosphate (FLEET ENEMA) 1 enema     Tdap (tetanus-diphtheria-acell pertussis) (ADACEL) injection 0.5 mL     tranexamic acid 1 g in  100 mL 0.7% NaCl IV bag (premix)             Physical Exam:   All vitals stable  Temp: 97.9  F (36.6  C) Temp src: Oral BP: 101/66 Pulse: 55   Resp: 16        Uterine fundus is firm, non-tender and at the level of the umbilicus          Data:     All laboratory data related to this surgery reviewed  Hemoglobin   Date Value Ref Range Status   09/10/2019 13.1 11.7 - 15.7 g/dL Final   06/15/2015 12.7 11.7 - 15.7 g/dL Final     No imaging studies have been ordered    Nallely Ta MD, MD

## 2021-06-23 NOTE — L&D DELIVERY NOTE
Delivery Date: 2021    DELIVERY NOTE    This is a spontaneous vaginal delivery on 2021.  Briefly, our delivery is as follows:  This patient is a 40-year-old  woman, para 2 with a past obstetrical history notable for 2 vaginal deliveries.  One was at 38 weeks' gestation, and the more recent one in  was at 34 weeks and 5 days' gestation.  She presented in the current pregnancy to [facility] on the morning hours of 2021 at 35 weeks' gestation, complaining of ruptured membranes.  Her group B strep status was unknown upon presentation.  Rupture of membranes was confirmed.  The patient was in early labor.  Due to group B strep status unknown, she was given intravenous antibiotics.  Her labor was expectantly managed.  Her vital signs were reassuring as was the fetal heart rate tracing.  After the antibiotics had been safely in her system for more than 3 hours, her labor was augmented with intravenous oxytocin. Four plus hours after the administration of the antibiotics, I performed artificial rupture of membranes of a forebag, releasing additional clear fluid.  She was entering the active phase of labor at this time at 6-7 cm.  She labored extremely rapidly thereafter without analgesia.  She reached complete dilatation.  Second stage of 1-2 minutes effected a spontaneous vaginal delivery at 5:37 p.m. 2021.  As the mouth and nares appeared, they were bulb suctioned.  The rest of the infant was delivered and handed to the mother.  A  nurse practitioner was present due to the  nature of the delivery, but the baby did well, not requiring specific interventions.  Female, 2800 grams, Apgars 9 and 9 at 1 and 5 minutes respectively.  Several minutes later, the cord was doubly clamped and cut.  The placenta delivered spontaneously intact.  It was found to have a 3-vessel cord.  The uterus was firming adequately with manual massage and  intravenous Pitocin.  She sustained a first-degree perineal laceration, which was repaired with 2 interrupted sutures of 3-0 chromic.      Quantitative blood loss 150 mL for this spontaneous vaginal delivery, repair of first-degree perineal laceration.      George Fournier MD        D: 2021   T: 2021   MT: OMAR    Name:     REBECA MUIR  MRN:      [id number]        Account:       [id number]   :      1980           Delivery Date: 2021     Document: [id number]

## 2021-06-23 NOTE — DISCHARGE SUMMARY
[facility]    Discharge Summary  Obstetrics    Date of Admission:  2021  Date of Discharge:  2021  Discharging Provider: Nallely Ta MD, MD  Date of Service (when I saw the patient): 21    Discharge Diagnoses   * No surgery found *    History of Present Illness   Jessie Rose is a 40 year old female who presented with labor symptoms.    Hospital Course   The patient's hospital course was unremarkable.  She recovered as anticipated and experienced no post-delivery complications.  On discharge, her pain was well controlled. Vaginal bleeding is similar to peak menstrual flow.  Voiding without difficulty.  Ambulating well and tolerating a normal diet.  No fevers.  Breastfeeding well.  Infant is stable.  She was discharged on post-partum day #2.    Post-partum hemoglobin:   Hemoglobin   Date Value Ref Range Status   09/10/2019 13.1 11.7 - 15.7 g/dL Final         Discharge Disposition   Discharged to home   Condition at discharge: Stable    Primary Care Physician   Physician No Ref-Primary    Consultations This Hospital Stay   HOME CARE POST PARTUM/ IP CONSULT  LACTATION IP CONSULT    Discharge Orders      Activity    Review discharge instructions     Reason for your hospital stay    Maternity care     Follow Up and recommended labs and tests    None     Discharge Instructions - Postpartum visit    Schedule postpartum visit with your provider and return to clinic in 6 weeks.     Activity    Review discharge instructions     Reason for your hospital stay    Maternity care     Discharge Instructions - Postpartum visit    Schedule postpartum visit with your provider and return to clinic in 8 weeks.     Diet    Resume previous diet     Diet    Resume previous diet     Discharge Medications   Current Discharge Medication List      START taking these medications    Details   acetaminophen (TYLENOL) 325 MG tablet Take 2 tablets (650 mg) by mouth every 4 hours as  needed for mild pain or fever (greater than or equal to 38  C /100.4  F (oral) or 38.5  C/ 101.4  F (core).)  Qty: 90 tablet, Refills: 0    Associated Diagnoses:  (normal spontaneous vaginal delivery)      ibuprofen (ADVIL/MOTRIN) 600 MG tablet Take 1 tablet (600 mg) by mouth every 6 hours as needed for other (cramping)  Qty: 90 tablet, Refills: 0    Associated Diagnoses:  (normal spontaneous vaginal delivery)         CONTINUE these medications which have NOT CHANGED    Details   Prenatal Vit-Fe Fumarate-FA (PRENATAL MULTIVITAMIN  PLUS IRON) 27-0.8 MG TABS Take 1 tablet by mouth daily      lanolin ointment Apply topically every hour as needed for dry skin (sore nipples)  Qty: 40 g, Refills: 0    Associated Diagnoses:  (normal spontaneous vaginal delivery);  premature rupture of membranes with onset of labor within 24 hours of rupture; Postpartum care following vaginal delivery; Obstetrical laceration, second degree           Allergies   Allergies   Allergen Reactions     Pcn [Penicillins] Rash

## 2021-06-23 NOTE — PLAN OF CARE
VSS. Fundus firm, midline U/2 with scant flow. Pain well controlled with tylenol/ibuprofen.  Up independently in room.  Working on breastfeeding  and  cares. Pumping after feedings. Progressing per care plan. Continue to monitor and notify MD as needed.

## 2021-06-23 NOTE — LACTATION NOTE
Initial visit with Sue and baby girl. Infant born at 35+1. Linette's last child was born at 34 weeks and was in the NICU. So this is not a new scenario for Linette. Linette and Primary RN both report infant is breastfeeding well. Infant is sleeping at time of visit but has a milk mustache. Linette was still breastfeeding her last baby at time of delivery so she has decent milk early milk. Infant has breast fed for every feeding except 1 so far, Linette did pump at that time and pumped 10 ml.    Discussed LPT expectations: 1) Feed the baby 2)Build/Protect/Maintain maternal milk supply 3) Keep it positive at the breast.    Offered encouragement that infant has been breastfeeding well so far, suggested that if infant tuckers out and is continually more sleepy at the breast. . .that would be normal. Utilize the breast pump and supplement infant.    Discussed  breastfeeding basics:   1) Watch for early feeding cues (licking lips, stirring or rooting, sucking movement with mouth, hands to mouth)  2) Feed infant on demand, a minimum of 8 times in 24 hours (recommended waking infant if it's been 3 hours since last feeding)  3) Techniques to waking a sleepy baby to nurse: (undress infant, change diaper if necessary, gently stroking bottom of feet and back, snuggling infant skin to skin, expressing colostrum).        Linette does not have a new breast pump for home use. Encouraged her to check with insurance for coverage for a new pump. Discussed two models we have available.    Katia Rojas RN, IBCLC

## 2021-06-23 NOTE — PLAN OF CARE
Vitals stable. Feels well. Oral pain medications working well. Breast feeding and pumping. Ready for discharge.

## 2021-06-23 NOTE — PLAN OF CARE
Vitals stable. Feels well. Denies pain. Breast feeding well. Ready for discharge home. Will continue to monitor.

## 2021-06-24 ENCOUNTER — LACTATION ENCOUNTER (OUTPATIENT)
Age: 41
End: 2021-06-24

## 2021-06-24 NOTE — LACTATION NOTE
This note was copied from a baby's chart.  Routine visit with Jessie and  Elissa.  Baby LPT 35 weeks, gained weight.  Getting ready for discharge.  Plan: Watch for feeding cues and feed every 2-3 hours and/or on demand. Continue to use feeding log to track intake and appropriate voids and stools. Take feeding log to first follow up appointment or weight check. Encourage skin to skin to promote frequent feedings, thermoregulation and bonding. Follow-up with healthcare provider or lactation consultant for questions or concerns.     Instructed on signs/symptoms of engorgement/ plugged ducts and mastitis.  Instructed on comfort measures and when to call MD.  No further questions at this time. Will follow as needed. Yanique Diego BSN, RN, PHN, RNC-MNN, IBCLC